# Patient Record
Sex: FEMALE | Race: WHITE
[De-identification: names, ages, dates, MRNs, and addresses within clinical notes are randomized per-mention and may not be internally consistent; named-entity substitution may affect disease eponyms.]

---

## 2017-10-25 ENCOUNTER — HOSPITAL ENCOUNTER (OUTPATIENT)
Dept: HOSPITAL 33 - SDC | Age: 67
Discharge: HOME | End: 2017-10-25
Attending: FAMILY MEDICINE
Payer: MEDICARE

## 2017-10-25 VITALS — SYSTOLIC BLOOD PRESSURE: 116 MMHG | OXYGEN SATURATION: 98 % | DIASTOLIC BLOOD PRESSURE: 72 MMHG | HEART RATE: 77 BPM

## 2017-10-25 DIAGNOSIS — K63.5: Primary | ICD-10-CM

## 2017-10-25 DIAGNOSIS — Z86.010: ICD-10-CM

## 2017-10-25 PROCEDURE — 36415 COLL VENOUS BLD VENIPUNCTURE: CPT

## 2017-10-25 PROCEDURE — 00810: CPT

## 2017-10-25 PROCEDURE — 0DBN8ZX EXCISION OF SIGMOID COLON, VIA NATURAL OR ARTIFICIAL OPENING ENDOSCOPIC, DIAGNOSTIC: ICD-10-PCS | Performed by: FAMILY MEDICINE

## 2017-10-25 PROCEDURE — 0DBK8ZX EXCISION OF ASCENDING COLON, VIA NATURAL OR ARTIFICIAL OPENING ENDOSCOPIC, DIAGNOSTIC: ICD-10-PCS | Performed by: FAMILY MEDICINE

## 2017-10-25 NOTE — OP
SURGERY DATE/TIME:  10/25/2017  0723    



PREOPERATIVE DIAGNOSIS:      History of colon polyps.



POSTOPERATIVE DIAGNOSIS:    Two colon polyps. 



PROCEDURE:    Colonoscopy. 



SURGEON: Linus Vences M.D.



ANESTHESIA:  MAC by Wyatt Goldstein CRNA.



ESTIMATED BLOOD LOSS:  Minimal. 



SPECIMENS:  Two hot forceps polypectomies. 



DESCRIPTION OF PROCEDURE: After informed written consent was obtained, the patient was 
taken to the endoscopy suite. She underwent monitored anesthesia and digital rectal exam 
showed normal sphincter tone and no internal lesions. The scope was inserted into the 
rectum and sequentially the entire colonic mucosa was traversed. The level of cecum was 
reached and verified with direct visualization of ileocecal valve.  There was a sessile 
polyp in the ascending colon which was grasped with forceps and cauterized with complete 
removal with good hemostasis. There were scattered diverticula throughout most of the 
length of the colon. There was another sessile polyp in the proximal sigmoid colon which 
was also removed with hot forceps in its entirety with good hemostasis and complete 
removal. The remainder of the exam was unremarkable. The scope was removed and the patient 
was transferred to the recovery room in excellent condition.

## 2018-02-27 ENCOUNTER — HOSPITAL ENCOUNTER (EMERGENCY)
Dept: HOSPITAL 33 - ED | Age: 68
Discharge: ANOTHER HOSPITAL | End: 2018-02-27
Payer: MEDICARE

## 2018-02-27 VITALS — SYSTOLIC BLOOD PRESSURE: 111 MMHG | HEART RATE: 112 BPM | DIASTOLIC BLOOD PRESSURE: 82 MMHG | OXYGEN SATURATION: 95 %

## 2018-02-27 DIAGNOSIS — R07.89: ICD-10-CM

## 2018-02-27 DIAGNOSIS — K21.9: ICD-10-CM

## 2018-02-27 DIAGNOSIS — M19.90: ICD-10-CM

## 2018-02-27 DIAGNOSIS — R06.03: Primary | ICD-10-CM

## 2018-02-27 DIAGNOSIS — E78.00: ICD-10-CM

## 2018-02-27 DIAGNOSIS — J44.9: ICD-10-CM

## 2018-02-27 DIAGNOSIS — Z79.899: ICD-10-CM

## 2018-02-27 DIAGNOSIS — Z85.118: ICD-10-CM

## 2018-02-27 DIAGNOSIS — I50.23: ICD-10-CM

## 2018-02-27 DIAGNOSIS — J45.909: ICD-10-CM

## 2018-02-27 DIAGNOSIS — Z87.891: ICD-10-CM

## 2018-02-27 DIAGNOSIS — I10: ICD-10-CM

## 2018-02-27 LAB
ALBUMIN SERPL-MCNC: 3.9 G/DL (ref 3.4–5)
ALP SERPL-CCNC: 85 U/L (ref 46–116)
ALT SERPL-CCNC: 26 U/L (ref 12–78)
ANION GAP SERPL CALC-SCNC: 15.3 MEQ/L (ref 5–15)
APTT PPP: 27.3 SECONDS (ref 25.3–37)
ARTERIAL PATENCY WRIST A: (no result)
AST SERPL QL: 22 U/L (ref 15–37)
BASE EXCESS BLDA CALC-SCNC: 1.6 MMOL/L (ref -2–2)
BILIRUB BLD-MCNC: 1.1 MG/DL (ref 0.2–1)
BNP SERPL-MCNC: 316 PG/ML (ref 0–125)
BUN SERPL-MCNC: 13 MG/DL (ref 9–20)
CALCIUM SPEC-MCNC: 9.1 MG/DL (ref 8.5–10.1)
CELLS COUNTED: 100
CHLORIDE SERPL-SCNC: 105 MEQ/L (ref 98–107)
CO2 SERPL-SCNC: 28.8 MEQ/L (ref 21–32)
COHGB BLD-MCNC: 1.9 % THGB (ref 0–6.9)
CREAT SERPL-MCNC: 0.64 MG/DL (ref 0.55–1.3)
FLUAV AG NPH QL IA: NEGATIVE
FLUBV AG NPH QL IA: NEGATIVE
GAS PNL BLDA: 12.5
GAS PNL BLDA: 37 C
GLUCOSE SERPL-MCNC: 149 MG/DL (ref 70–110)
GRANULOCYTES # BLD AUTO: 13.7 10*3/UL (ref 1.4–6.9)
HCO3 BLDA-SCNC: 28.5 MMOL/L (ref 22–28)
HCT VFR BLD AUTO: 38.5 % (ref 35–47)
HGB BLD-MCNC: 12.4 GM/DL (ref 12–16)
INHALED O2 CONCENTRATION: 100 %
INR PPP: 1.24 (ref 0.8–3)
MANUAL DIF COMMENT BLD-IMP: NORMAL
MCH RBC QN AUTO: 29.6 PG (ref 26–32)
MCHC RBC AUTO-ENTMCNC: 32.2 G/DL (ref 32–36)
METHGB MFR BLDA: 1.2 % (ref 1.4–1.5)
O2 A-A PPRESDIFF RESPIRATORY: 267 MM[HG]
PCO2 BLDA: 54 MMHG (ref 35–45)
PLATELET # BLD AUTO: 317 K/MM3 (ref 150–450)
PO2 BLDA: 379 MMHG (ref 75–100)
POTASSIUM BLDA-SCNC: 3.4 MMOL/L (ref 3.5–5.1)
POTASSIUM SERPLBLD-SCNC: 4.1 MEQ/L (ref 3.5–5.1)
PROT SERPL-MCNC: 8 GM/DL (ref 6.4–8.2)
RBC # BLD AUTO: 4.19 M/MM3 (ref 4.1–5.4)
RSV AG SPEC QL IA: POSITIVE
SAO2 % BLDA FROM PO2: 0.59 %
SAO2 % BLDA: 100.9 % (ref 95–100)
SAO2 % BLDA: 97.7 G/DF (ref 94–100)
SODIUM SERPL-SCNC: 145 MEQ/L (ref 136–145)
TOXIC GRANULES BLD QL SMEAR: (no result)
WBC # BLD AUTO: 21.4 K/MM3 (ref 4–10.5)

## 2018-02-27 PROCEDURE — 82803 BLOOD GASES ANY COMBINATION: CPT

## 2018-02-27 PROCEDURE — 71045 X-RAY EXAM CHEST 1 VIEW: CPT

## 2018-02-27 PROCEDURE — 84484 ASSAY OF TROPONIN QUANT: CPT

## 2018-02-27 PROCEDURE — 36600 WITHDRAWAL OF ARTERIAL BLOOD: CPT

## 2018-02-27 PROCEDURE — 83605 ASSAY OF LACTIC ACID: CPT

## 2018-02-27 PROCEDURE — 96365 THER/PROPH/DIAG IV INF INIT: CPT

## 2018-02-27 PROCEDURE — 85610 PROTHROMBIN TIME: CPT

## 2018-02-27 PROCEDURE — 99292 CRITICAL CARE ADDL 30 MIN: CPT

## 2018-02-27 PROCEDURE — 96360 HYDRATION IV INFUSION INIT: CPT

## 2018-02-27 PROCEDURE — 96361 HYDRATE IV INFUSION ADD-ON: CPT

## 2018-02-27 PROCEDURE — 85025 COMPLETE CBC W/AUTO DIFF WBC: CPT

## 2018-02-27 PROCEDURE — 36415 COLL VENOUS BLD VENIPUNCTURE: CPT

## 2018-02-27 PROCEDURE — 87631 RESP VIRUS 3-5 TARGETS: CPT

## 2018-02-27 PROCEDURE — 94002 VENT MGMT INPAT INIT DAY: CPT

## 2018-02-27 PROCEDURE — 93005 ELECTROCARDIOGRAM TRACING: CPT

## 2018-02-27 PROCEDURE — 87040 BLOOD CULTURE FOR BACTERIA: CPT

## 2018-02-27 PROCEDURE — 94640 AIRWAY INHALATION TREATMENT: CPT

## 2018-02-27 PROCEDURE — 80053 COMPREHEN METABOLIC PANEL: CPT

## 2018-02-27 PROCEDURE — 83880 ASSAY OF NATRIURETIC PEPTIDE: CPT

## 2018-02-27 PROCEDURE — 82375 ASSAY CARBOXYHB QUANT: CPT

## 2018-02-27 PROCEDURE — 83735 ASSAY OF MAGNESIUM: CPT

## 2018-02-27 PROCEDURE — 99291 CRITICAL CARE FIRST HOUR: CPT

## 2018-02-27 PROCEDURE — 85730 THROMBOPLASTIN TIME PARTIAL: CPT

## 2018-02-27 PROCEDURE — 93041 RHYTHM ECG TRACING: CPT

## 2018-02-27 PROCEDURE — 51702 INSERT TEMP BLADDER CATH: CPT

## 2018-02-27 NOTE — ERPHSYRPT
- History of Present Illness


Time Seen by Provider: 02/27/18 00:20


Source: patient, EMS


Exam Limitations: no limitations


Patient Subjective Stated Complaint: c/o SOA x 2 days


Triage Nursing Assessment: pt c/o SOA, wheezes throughout, anxious.


Physician History: 





66 y/o female with history of COPD and CHF brought in by ambulance for 

worsening shortness of breath for the past 2 days. Pt arrives to the ER in 

severe respiratory distress. Pt was immediately placed on bibap which has 

helped. Pt says that she does not want to be intubated. Pt states that she has 

not taken her medications over the past couple of days, including lasix. Pt 

also admits to having substernal chest pain that started this evening, 

tightness sensation, with radiation to back and lasting for 5 minutes.


Timing/Duration: day(s)


Activities at Onset: none


Severity of Dyspnea-Max: severe


Severity of Dyspnea-Current: severe


Possible Cause: frequent episodes


Modifying Factors: Improves With: nothing


Associated Symptoms: anxiety, chest pain/discomfort, productive cough


International travel in last 2 weeks: No


Allergies/Adverse Reactions: 








Tetanus Vaccines and Toxoid [Tetanus] Allergy (Severe, Verified 10/25/17 06:18)


 throat swelling





Home Medications: 








Albuterol Sulfate Mdi*** [Proair Hfa MDI***] 8.5 gm IH Q4HWA PRN 09/28/12 [

History]


Albuterol/Ipratropium 3ml Neb* [DUONEB 0.5-3 MG/3 ml Neb**] 3 ml IH Q4H 09/28/ 12 [History]


Atorvastatin Calcium [Lipitor] 40 mg PO HS 09/28/12 [History]


Calcium Citrate/Vitamin D3 [Citracal + D Caplet] 2 each PO BID 09/28/12 [History

]


Carvedilol 6.25 mg*** [Coreg 6.25 MG***] 12.5 mg PO BID 09/28/12 [History]


Furosemide 40 mg PO DAILY 09/29/14 [History]


Gabapentin 300 mg PO TID 09/29/14 [History]


Multivitamin [Multi-Vitamin Daily] 1 tab PO DAILY 09/29/14 [History]


Losartan Potassium 50 mg*** [Cozaar 50 MG***] 1 tab PO DAILY 06/26/16 [History]


Magnesium Oxide 400 mg*** [Mag-Ox 400***] 1 tab PO DAILY 06/26/16 [History]


Tiotropium Bromide [Spiriva] 18 mcg IH DAILY 06/27/16 [History]


Biotin 10,000 mcg PO DAILY 10/18/17 [History]


Cholecalciferol (Vitamin D3) [Vitamin D3] 5,000 unit PO DAILY 10/18/17 [History]


PANTOPRAZOLE 40 mg Tablet*** [Protonix 40MG Tablet***] 40 mg PO DAILY 10/18/17 [

History]


Potassium Chloride 10 Meq Tab* [Klor Con 10 MEQ***] 10 meq PO BID 10/18/17 [

History]


Hydrocodone Bit/Homatropine [Hydrocodone Compound Syrup] 480 ml PO Q12H PRN PRN 

10/25/17 [History]


Nitroglycerin 2.5 mg*** [Nitro-Bid 2.5 mg***] 2.5 mg PO DAILY 10/25/17 [History]


Zoledronic Acid/Mannitol/Wate* [Reclast 5 MG/100 Ml Solution***] 5 mg IV DAILY 

10/25/17 [History]


Cephalexin 500 mg PO TID 11/28/17 [History]





Hx Tetanus, Diphtheria Vaccination/Date Given: Yes


Hx Influenza Vaccination/Date Given: Yes (10/2014)


Hx Pneumococcal Vaccination/Date Given: Yes (2014)


Immunizations Up to Date: Yes





- Review of Systems


Constitutional: No Fever, No Chills


Eyes: No Symptoms


Ears, Nose, & Throat: No Symptoms


Respiratory: Cough, Dyspnea, Dyspnea on Exertion (BEDOYA), Wheezing


Cardiac: Chest Pain, Edema, No Syncope


Abdominal/Gastrointestinal: No Abdominal Pain, No Nausea, No Vomiting, No 

Diarrhea


Genitourinary Symptoms: No Dysuria


Musculoskeletal: No Back Pain, No Neck Pain


Skin: No Rash


Neurological: No Dizziness, No Focal Weakness, No Sensory Changes


Psychological: No Symptoms


Endocrine: No Symptoms


All Other Systems: Reviewed and Negative





- Past Medical History


Pertinent Past Medical History: Yes


Neurological History: No Pertinent History


ENT History: Cataracts


Cardiac History: Angina, High Cholesterol, Hypertension


Respiratory History: Asthma, COPD, Emphysema, Lung Cancer, Pneumonia


Endocrine Medical History: No Pertinent History


Musculoskeletal History: Arthritis, Fractures


GI Medical History: GERD, Hemorrhoids, Polyps


 History: No Pertinent History


Psycho-Social History: Anxiety


Female Reproductive Disorders: No Pertinent History


Other Medical History: frequent urination, bladder tie up x2





- Past Surgical History


Past Surgical History: Yes


Neuro Surgical History: No Pertinent History


Cardiac: Cardiac Catheterization


Respiratory: Lobectomy


Gastrointestinal: Appendectomy


Genitourinary: No Pertinent History


Musculoskeletal: No Pertinent History


Female Surgical History: Hysterectomy, Tubal Ligation


Other Surgical History: states right upper lobe lung removed., right total knee 

replacement





- Social History


Smoking Status: Former smoker


How long have you smoked: 40 years


Exposure to second hand smoke: Yes


Drug Use: none





- Female History


Hx Pregnant Now: No





- Nursing Vital Signs


Nursing Vital Signs: 


 Initial Vital Signs











Temperature  97.5 F   02/27/18 00:33


 


Pulse Rate  120 H  02/27/18 00:33


 


Respiratory Rate  34 H  02/27/18 00:33


 


O2 Sat by Pulse Oximetry  9 L  02/27/18 00:33








 Pain Scale











Pain Intensity                 4

















- Physical Exam


General Appearance: no apparent distress, alert


Eye Exam: PERRL/EOMI


Ears, Nose, Throat Exam: hearing grossly normal, normal ENT inspection


Neck Exam: normal inspection, supple


Respiratory Exam: respiratory distress, accessory muscle use, crackles/rales, 

wheezing


Cardiovascular/Chest Exam: normal heart sounds, regular rate/rhythm


Abdominal/Gastrointestinal Exam: soft, No tenderness, No distention, No mass


Extremity Exam: non-tender, normal range of motion, normal inspection, no calf 

tenderness, no pedal edema


Neurologic Exam: alert, oriented x 3, cooperative, CNs II-XII nml as tested, 

sensation nml, No motor deficits


Skin Exam: normal color, warm, No dry


**SpO2 Interpretation**: normal


SpO2: 99





- Course


Nursing assessment & vital signs reviewed: Yes


EKG Interpreted by Me: RATE (hr 105), Ischemic ST-T changes (ST depressions in 

leads V2, V3, V4)


Ordered Tests: 


 Active Orders 24 hr











 Category Date Time Status


 


 Cardiac Monitor STAT Care  02/27/18 00:19 Active


 


 EKG-ER Only STAT Care  02/27/18 00:18 Active


 


 Baker [Catheter-[place] Baker] STAT Care  02/27/18 00:24 Active


 


 IV Insertion STAT Care  02/27/18 00:18 Active


 


 NPO (ED) STAT Care  02/27/18 00:18 Active


 


 CHEST 1 VIEW (PORTABLE) Stat Exams  02/27/18 00:16 Taken


 


 ARTERIAL BLOOD GASES Stat Lab  02/27/18 00:30 Completed


 


 BLOOD CULTURE Stat Lab  02/27/18 00:53 Received


 


 CBC W DIFF Stat Lab  02/27/18 00:25 Completed


 


 CMP Stat Lab  02/27/18 00:25 Completed


 


 Lactic Acid Stat Lab  02/27/18 00:30 Completed


 


 MAGNESIUM Stat Lab  02/27/18 00:25 Completed


 


 Manual Differential NC Stat Lab  02/27/18 00:25 Completed


 


 NT PRO BNP Stat Lab  02/27/18 00:25 Completed


 


 PROTIME WITH INR Stat Lab  02/27/18 00:25 Completed


 


 PTT Stat Lab  02/27/18 00:25 Completed


 


 TROPONIN Q3H Lab  02/27/18 00:25 Completed


 


 TROPONIN Q3H Lab  02/27/18 03:30 Ordered


 


 TROPONIN Q3H Lab  02/27/18 06:30 Ordered


 


 TROPONIN Q3H Lab  02/27/18 09:30 Ordered


 


 TROPONIN Q3H Lab  02/27/18 12:30 Ordered


 


 BiPap/CPAP Assessment STAT RT  02/27/18 00:18 Active


 


 Respiratory Nebulizer STAT RT  02/27/18 00:19 Completed








Medication Summary














Discontinued Medications














Generic Name Dose Route Start Last Admin





  Trade Name Freq  PRN Reason Stop Dose Admin


 


Albuterol Sulfate  2.5 mg  02/27/18 00:18  02/27/18 00:45





  Proventil 2.5 Mg/3 Ml Neb***  IH  02/27/18 00:19  2.5 mg





  STAT ONE   Administration


 


Albuterol Sulfate  Confirm  02/27/18 00:43  





  Proventil 2.5 Mg/3 Ml Neb***  Administered  02/27/18 00:44  





  Dose   





  2.5 mg   





  IH   





  .STK-MED ONE   


 


Aspirin  324 mg  02/27/18 00:42  02/27/18 00:50





  Baby Aspirin 81 Mg Chew***  PO  02/27/18 00:43  324 mg





  STAT ONE   Administration


 


Aspirin  Confirm  02/27/18 00:52  





  Baby Aspirin 81 Mg Chew***  Administered  02/27/18 00:53  





  Dose   





  324 mg   





  .ROUTE   





  .STK-MED ONE   


 


Furosemide  60 mg  02/27/18 00:24  02/27/18 00:32





  Lasix 40 Mg/4 Ml***  IV  02/27/18 00:25  60 mg





  STAT ONE   Administration


 


Furosemide  Confirm  02/27/18 00:25  





  Furosemide 100mg/10 Ml Vial***  Administered  02/27/18 00:26  





  Dose   





  100 mg   





  .ROUTE   





  .STK-MED ONE   


 


Azithromycin  500 mg in 250 mls @ 250 mls/hr  02/27/18 01:28  02/27/18 01:51





  Zithromax 500 Mg/ 250 Ml Nacl Premix  IV  02/27/18 02:27  250 mls/hr





  STAT ONE   Administration


 


Azithromycin  Confirm  02/27/18 01:35  





  Zithromax 500 Mg/ 250 Ml Nacl Premix  Administered  02/27/18 01:36  





  Dose   





  500 mg in 250 mls @ ud   





  IV   





  .STK-MED ONE   


 


Lorazepam  0.5 mg  02/27/18 00:20  02/27/18 00:21





  Ativan 2 Mg/1 Ml Vial***  IV  02/27/18 00:21  0.5 mg





  STAT ONE   Administration


 


Lorazepam  Confirm  02/27/18 00:19  





  Ativan 2 Mg/1 Ml Vial***  Administered  02/27/18 00:20  





  Dose   





  2 mg   





  .ROUTE   





  .STK-MED ONE   


 


Lorazepam  0.5 mg  02/27/18 01:48  02/27/18 01:51





  Ativan 2 Mg/1 Ml Vial***  IV  02/27/18 01:49  0.5 mg





  ONCE ONE   Administration


 


Lorazepam  Confirm  02/27/18 01:48  





  Ativan 2 Mg/1 Ml Vial***  Administered  02/27/18 01:49  





  Dose   





  2 mg   





  .ROUTE   





  .STK-MED ONE   


 


Lorazepam  0.5 mg  02/27/18 02:52  02/27/18 02:56





  Ativan 2 Mg/1 Ml Vial***  IV  02/27/18 02:53  0.5 mg





  STAT ONE   Administration











Lab/Rad Data: 


 Laboratory Result Diagrams





 02/27/18 00:25 





 02/27/18 00:25 





 Laboratory Results











  02/27/18 02/27/18 02/27/18 Range/Units





  00:58 00:30 00:25 


 


WBC     (4.0-10.5)  K/mm3


 


RBC     (4.1-5.4)  M/mm3


 


Hgb     (12.0-16.0)  gm/dl


 


Hct     (35-47)  %


 


MCV     ()  fl


 


MCH     (26-32)  pg


 


MCHC     (32-36)  g/dl


 


RDW     (11.5-14.0)  %


 


Plt Count     (150-450)  K/mm3


 


MPV     (6-9.5)  fl


 


INR     (0.8-3.0)  


 


APTT     (25.3-37.0)  SECONDS


 


Puncture Site   rr   


 


pCO2   54 H   (35-45)  mmHg


 


pO2   379 H*   ()  mmHg


 


Base Excess   1.6   (-2.0-2.0)  


 


O2 Saturation   97.7   ()  g/dF


 


ABG pH   7.33 L   (7.35-7.45)  


 


ABG HCO3   28.5 H   (22-28)  


 


ABG O2 Sat (Measured)   100.9 H   ()  %


 


Mal Test   y   


 


A-a Gradient   267   


 


a/A Ratio   0.59   


 


Hemoglobin   12.5   


 


Carboxyhemoglobin   1.9   (0.0-6.9)  % THgb


 


Methemoglobin   1.2 L   (1.4-1.5)  %


 


Temperature   37.0   C


 


POC O2 Flow Rate   100   %


 


Vent Mode   BiPAP   


 


Inspiratory BiPAP   11   


 


Expiratory BiPAP   6   


 


Sodium     (136-145)  mEq/L


 


Potassium   3.4 L   (3.5-5.1)  mEq/L


 


Chloride     ()  mEq/L


 


Carbon Dioxide     (21-32)  mEq/L


 


Anion Gap     (5-15)  MEQ/L


 


BUN     (9-20)  mg/dL


 


Creatinine     (0.55-1.30)  mg/dl


 


Estimated GFR     ML/MIN


 


Glucose     ()  MG/DL


 


Lactic Acid   0.7   (0.4-2.0)  


 


Calcium     (8.5-10.1)  mg/dL


 


Magnesium     (1.8-2.4)  mg/dL


 


Total Bilirubin     (0.2-1.0)  mg/dL


 


AST     (15-37)  U/L


 


ALT     (12-78)  U/L


 


Alkaline Phosphatase     ()  U/L


 


Troponin I    < 0.017  (0.000-0.056)  ng/ml


 


NT-Pro-B Natriuret Pep     (0-125)  pg/ml


 


Serum Total Protein     (6.4-8.2)  gm/dL


 


Albumin     (3.4-5.0)  g/dL


 


Influenza Type A Ag  NEGATIVE    (NEGATIVE)  


 


Influenza Type B Ag  NEGATIVE    (NEGATIVE)  


 


RSV (PCR)  POSITIVE    (Negative)  














  02/27/18 02/27/18 02/27/18 Range/Units





  00:25 00:25 00:25 


 


WBC    21.4 H  (4.0-10.5)  K/mm3


 


RBC    4.19  (4.1-5.4)  M/mm3


 


Hgb    12.4  (12.0-16.0)  gm/dl


 


Hct    38.5  (35-47)  %


 


MCV    91.9  ()  fl


 


MCH    29.6  (26-32)  pg


 


MCHC    32.2  (32-36)  g/dl


 


RDW    13.6  (11.5-14.0)  %


 


Plt Count    317  (150-450)  K/mm3


 


MPV    11.3 H  (6-9.5)  fl


 


INR  1.24    (0.8-3.0)  


 


APTT  27.3    (25.3-37.0)  SECONDS


 


Puncture Site     


 


pCO2     (35-45)  mmHg


 


pO2     ()  mmHg


 


Base Excess     (-2.0-2.0)  


 


O2 Saturation     ()  g/dF


 


ABG pH     (7.35-7.45)  


 


ABG HCO3     (22-28)  


 


ABG O2 Sat (Measured)     ()  %


 


Mal Test     


 


A-a Gradient     


 


a/A Ratio     


 


Hemoglobin     


 


Carboxyhemoglobin     (0.0-6.9)  % THgb


 


Methemoglobin     (1.4-1.5)  %


 


Temperature     C


 


POC O2 Flow Rate     %


 


Vent Mode     


 


Inspiratory BiPAP     


 


Expiratory BiPAP     


 


Sodium   145   (136-145)  mEq/L


 


Potassium   4.1   (3.5-5.1)  mEq/L


 


Chloride   105   ()  mEq/L


 


Carbon Dioxide   28.8   (21-32)  mEq/L


 


Anion Gap   15.3 H   (5-15)  MEQ/L


 


BUN   13   (9-20)  mg/dL


 


Creatinine   0.64   (0.55-1.30)  mg/dl


 


Estimated GFR   > 60   ML/MIN


 


Glucose   149 H   ()  MG/DL


 


Lactic Acid     (0.4-2.0)  


 


Calcium   9.1   (8.5-10.1)  mg/dL


 


Magnesium   1.8   (1.8-2.4)  mg/dL


 


Total Bilirubin   1.10 H   (0.2-1.0)  mg/dL


 


AST   22   (15-37)  U/L


 


ALT   26   (12-78)  U/L


 


Alkaline Phosphatase   85   ()  U/L


 


Troponin I     (0.000-0.056)  ng/ml


 


NT-Pro-B Natriuret Pep   316 H   (0-125)  pg/ml


 


Serum Total Protein   8.0   (6.4-8.2)  gm/dL


 


Albumin   3.9   (3.4-5.0)  g/dL


 


Influenza Type A Ag     (NEGATIVE)  


 


Influenza Type B Ag     (NEGATIVE)  


 


RSV (PCR)     (Negative)  














- Progress


Progress: improved


Air Movement: fair


Progress Note: 





02/27/18 02:43


The CXR shows increase vascular congestion. The patient has shown significant 

improvement since arriving to the ER after being placed on bipap. Pt was 

already given a dose of solumedrol 125mg in route and has received 3 doses of 

duonebs as well as lasix 60mg with baker catheter insertion. Pt is RSV 

positive. Influenza is negative. Pt was started on azithromycin. Each time the 

mask is taken off, pt starts to decompensate. Pt also has ST depressions in 

leads V2-V4 but the first troponin was negative. Pt was given a dose of ASA. We 

don't have ICU staffing and the patient will require a higher level of care. Pt 

[facility] and will be transferred under the care 

of Dr Wills.


02/27/18 02:53








- Departure


Time of Disposition: 02:52


Departure Disposition: Transfer


Clinical Impression: 


 Respiratory distress





COPD (chronic obstructive pulmonary disease)


Qualifiers:


 COPD type: unspecified COPD Qualified Code(s): J44.9 - Chronic obstructive 

pulmonary disease, unspecified





CHF (congestive heart failure)


Qualifiers:


 Heart failure type: systolic Heart failure chronicity: acute on chronic 

Qualified Code(s): I50.23 - Acute on chronic systolic (congestive) heart failure





Condition: Critical


Critical Care Time: Yes


Critical Care Time(excluding separately billable procedures):  minutes


Referrals: 


OMEGA RODGERS [Primary Care Provider] - 


Instructions:  Heart Failure, Chronic Obstructive Pulmonary Disease

## 2018-02-27 NOTE — XRAY
Indication: Short of breath.  History right lung cancer.



Comparison: June 26, 2016.



Portable chest unchanged with stable right hemithorax postsurgical changes and

right hilar fullness.  Left lung remains clear.  Heart is not enlarged.  Bony

thorax intact again with mild osteopenia and degenerative changes.



Impression: Stable nonacute chest with chronic features.

## 2023-08-19 ENCOUNTER — HOSPITAL ENCOUNTER (INPATIENT)
Dept: HOSPITAL 33 - ED | Age: 73
LOS: 8 days | Discharge: NURSING, REHAB OR LONG-TERM CARE | DRG: 190 | End: 2023-08-27
Attending: INTERNAL MEDICINE | Admitting: INTERNAL MEDICINE
Payer: MEDICARE

## 2023-08-19 DIAGNOSIS — Z79.899: ICD-10-CM

## 2023-08-19 DIAGNOSIS — Z20.828: ICD-10-CM

## 2023-08-19 DIAGNOSIS — M19.90: ICD-10-CM

## 2023-08-19 DIAGNOSIS — Z85.118: ICD-10-CM

## 2023-08-19 DIAGNOSIS — M54.9: ICD-10-CM

## 2023-08-19 DIAGNOSIS — H57.11: ICD-10-CM

## 2023-08-19 DIAGNOSIS — I50.9: ICD-10-CM

## 2023-08-19 DIAGNOSIS — J44.1: Primary | ICD-10-CM

## 2023-08-19 DIAGNOSIS — Z87.891: ICD-10-CM

## 2023-08-19 DIAGNOSIS — J18.9: ICD-10-CM

## 2023-08-19 DIAGNOSIS — Z99.81: ICD-10-CM

## 2023-08-19 DIAGNOSIS — E78.5: ICD-10-CM

## 2023-08-19 DIAGNOSIS — N39.0: ICD-10-CM

## 2023-08-19 DIAGNOSIS — I11.0: ICD-10-CM

## 2023-08-19 LAB
ALBUMIN SERPL-MCNC: 4.2 G/DL (ref 3.5–5)
ALP SERPL-CCNC: 96 U/L (ref 38–126)
ALT SERPL-CCNC: 28 U/L (ref 0–35)
ANION GAP SERPL CALC-SCNC: 10.6 MEQ/L (ref 5–15)
APTT PPP: 28.2 SECONDS (ref 25.1–36.5)
AST SERPL QL: 45 U/L (ref 14–36)
BACTERIA UR CULT: (no result)
BASOPHILS # BLD AUTO: 0.04 X10^3/UL (ref 0–0.4)
BASOPHILS NFR BLD AUTO: 0.4 % (ref 0–0.4)
BILIRUB BLD-MCNC: 1.7 MG/DL (ref 0.2–1.3)
BUN SERPL-MCNC: 22 MG/DL (ref 7–17)
CALCIUM SPEC-MCNC: 9.3 MG/DL (ref 8.4–10.2)
CHLORIDE SERPL-SCNC: 89 MMOL/L (ref 98–107)
CO2 SERPL-SCNC: 38 MMOL/L (ref 22–30)
CREAT SERPL-MCNC: 0.73 MG/DL (ref 0.52–1.04)
EOSINOPHIL # BLD AUTO: 0.01 X10^3/UL (ref 0–0.5)
FLUAV AG NPH QL IA: NEGATIVE
FLUBV AG NPH QL IA: NEGATIVE
GFR SERPLBLD BASED ON 1.73 SQ M-ARVRAT: > 60 ML/MIN
GLUCOSE SERPL-MCNC: 144 MG/DL (ref 74–106)
HCT VFR BLD AUTO: 37 % (ref 35–47)
HGB BLD-MCNC: 11.8 G/DL (ref 12–16)
IMM GRANULOCYTES # BLD: 0.09 X10^3U/L (ref 0–0.03)
IMM GRANULOCYTES NFR BLD: 0.9 % (ref 0–0.4)
INR PPP: 1.05 (ref 0.8–3)
LYMPHOCYTES # SPEC AUTO: 0.66 X10^3/UL (ref 1–4.6)
MCH RBC QN AUTO: 29 PG (ref 26–32)
MCHC RBC AUTO-ENTMCNC: 31.9 G/DL (ref 32–36)
MONOCYTES # BLD AUTO: 0.33 X10^3/UL (ref 0–1.3)
NRBC # BLD AUTO: 0 X10^3U/L (ref 0–0.01)
NRBC BLD AUTO-RTO: 0 % (ref 0–0.1)
NT-PROBNP SERPL-MCNC: 1080 PG/ML (ref ?–300)
PLATELET # BLD AUTO: 279 X10^3/UL (ref 150–450)
POTASSIUM SERPLBLD-SCNC: 4.2 MMOL/L (ref 3.5–5.1)
PROT SERPL-MCNC: 7 G/DL (ref 6.3–8.2)
PROTHROMBIN TIME: 11.4 SECONDS (ref 9.4–12.5)
RBC # BLD AUTO: 4.07 X10^6/UL (ref 4.1–5.4)
RBC # URNS HPF: (no result) /HPF (ref 0–5)
RSV AG SPEC QL IA: NEGATIVE
SARS-COV-2 AG RESP QL IA.RAPID: NEGATIVE
SODIUM SERPL-SCNC: 133 MMOL/L (ref 137–145)
WBC # BLD AUTO: 10.2 X10^3/UL (ref 4–10.5)
WBC URNS QL MICRO: (no result) /HPF (ref 0–5)

## 2023-08-19 PROCEDURE — 94640 AIRWAY INHALATION TREATMENT: CPT

## 2023-08-19 PROCEDURE — 99100 ANES PT EXTEME AGE<1 YR&>70: CPT

## 2023-08-19 PROCEDURE — 85027 COMPLETE CBC AUTOMATED: CPT

## 2023-08-19 PROCEDURE — 93041 RHYTHM ECG TRACING: CPT

## 2023-08-19 PROCEDURE — 94003 VENT MGMT INPAT SUBQ DAY: CPT

## 2023-08-19 PROCEDURE — 96374 THER/PROPH/DIAG INJ IV PUSH: CPT

## 2023-08-19 PROCEDURE — 94002 VENT MGMT INPAT INIT DAY: CPT

## 2023-08-19 PROCEDURE — 94668 MNPJ CHEST WALL SBSQ: CPT

## 2023-08-19 PROCEDURE — 0241U: CPT

## 2023-08-19 PROCEDURE — 85730 THROMBOPLASTIN TIME PARTIAL: CPT

## 2023-08-19 PROCEDURE — 36410 VNPNXR 3YR/> PHY/QHP DX/THER: CPT

## 2023-08-19 PROCEDURE — 83605 ASSAY OF LACTIC ACID: CPT

## 2023-08-19 PROCEDURE — 36415 COLL VENOUS BLD VENIPUNCTURE: CPT

## 2023-08-19 PROCEDURE — 80198 ASSAY OF THEOPHYLLINE: CPT

## 2023-08-19 PROCEDURE — 93005 ELECTROCARDIOGRAM TRACING: CPT

## 2023-08-19 PROCEDURE — 82803 BLOOD GASES ANY COMBINATION: CPT

## 2023-08-19 PROCEDURE — 96365 THER/PROPH/DIAG IV INF INIT: CPT

## 2023-08-19 PROCEDURE — 99285 EMERGENCY DEPT VISIT HI MDM: CPT

## 2023-08-19 PROCEDURE — 87040 BLOOD CULTURE FOR BACTERIA: CPT

## 2023-08-19 PROCEDURE — 93268 ECG RECORD/REVIEW: CPT

## 2023-08-19 PROCEDURE — G0378 HOSPITAL OBSERVATION PER HR: HCPCS

## 2023-08-19 PROCEDURE — 94760 N-INVAS EAR/PLS OXIMETRY 1: CPT

## 2023-08-19 PROCEDURE — 82375 ASSAY CARBOXYHB QUANT: CPT

## 2023-08-19 PROCEDURE — 71250 CT THORAX DX C-: CPT

## 2023-08-19 PROCEDURE — 83880 ASSAY OF NATRIURETIC PEPTIDE: CPT

## 2023-08-19 PROCEDURE — 71045 X-RAY EXAM CHEST 1 VIEW: CPT

## 2023-08-19 PROCEDURE — 96375 TX/PRO/DX INJ NEW DRUG ADDON: CPT

## 2023-08-19 PROCEDURE — 85025 COMPLETE CBC W/AUTO DIFF WBC: CPT

## 2023-08-19 PROCEDURE — 71046 X-RAY EXAM CHEST 2 VIEWS: CPT

## 2023-08-19 PROCEDURE — 80053 COMPREHEN METABOLIC PANEL: CPT

## 2023-08-19 PROCEDURE — 36600 WITHDRAWAL OF ARTERIAL BLOOD: CPT

## 2023-08-19 PROCEDURE — 87086 URINE CULTURE/COLONY COUNT: CPT

## 2023-08-19 PROCEDURE — 51702 INSERT TEMP BLADDER CATH: CPT

## 2023-08-19 PROCEDURE — 81001 URINALYSIS AUTO W/SCOPE: CPT

## 2023-08-19 PROCEDURE — 94667 MNPJ CHEST WALL 1ST: CPT

## 2023-08-19 PROCEDURE — 84484 ASSAY OF TROPONIN QUANT: CPT

## 2023-08-19 PROCEDURE — 36000 PLACE NEEDLE IN VEIN: CPT

## 2023-08-19 PROCEDURE — 85610 PROTHROMBIN TIME: CPT

## 2023-08-19 PROCEDURE — 99291 CRITICAL CARE FIRST HOUR: CPT

## 2023-08-19 RX ADMIN — POTASSIUM CHLORIDE SCH MEQ: 10 TABLET, EXTENDED RELEASE ORAL at 23:21

## 2023-08-19 RX ADMIN — SIMVASTATIN SCH MG: 20 TABLET, FILM COATED ORAL at 23:20

## 2023-08-19 RX ADMIN — ALPRAZOLAM PRN MG: 1 TABLET ORAL at 23:34

## 2023-08-19 RX ADMIN — IPRATROPIUM BROMIDE AND ALBUTEROL SULFATE SCH ML: .5; 3 SOLUTION RESPIRATORY (INHALATION) at 23:43

## 2023-08-19 RX ADMIN — CARVEDILOL SCH MG: 12.5 TABLET, FILM COATED ORAL at 23:42

## 2023-08-19 NOTE — ERPHSYRPT
- History of Present Illness


Source: patient, EMS


Exam Limitations: no limitations


Patient Subjective Stated Complaint: pt reports shortness of breath beginning 

last evening, reports using her home nebulizer and inhalers without any relief


Triage Nursing Assessment: pt is aox3, short of breath upon arrival, pt with 

audible wheezes and wet cough appreciated, afebrile, radial pulses strong and 

equal, cap refill < 3 seconds, pt skin pink warm dry. edema noted to the BLE, 

skin is red, shiny and tight. skin is intact at this time.


Physician History: 





72 yo WF who is 4L O2 dependent presents w dyspnea since last PM which occurs at

rest and exertion. Pt had some sub-sternal chest pain last night which was 

relieved by 2 SL NTG. She denies current chest 

pain/cough/coryza/fever/nausea/vomiting/diarrhea/melena/hematochezia. Pt has 2+ 

pre-tibial edema w mild erythema which pt states is chronic. 


Timing/Duration: other (Last night)


Activities at Onset: rest


Severity of Dyspnea-Max: moderate


Severity of Dyspnea-Current: moderate


Possible Cause: frequent episodes


Modifying Factors: Improves With: activity, exertion


Associated Symptoms: denies symptoms, edema


Allergies/Adverse Reactions: 








Tetanus Vaccines and Toxoid [Tetanus] Allergy (Severe, Verified 08/19/23 16:08)


   throat swelling





Home Medications: 








Albuterol Sulfate Mdi*** [ALBUTEROL/Proair Hfa MDI***] 8.5 gm IH Q4HWA PRN 

09/28/12 [History]


Albuterol/Ipratropium 3ml Neb* [DUONEB 0.5-3 MG/3 ml Neb**] 3 ml IH Q4H 09/28/12

[History]


Atorvastatin Calcium [Lipitor] 40 mg PO HS 09/28/12 [History]


Calcium Citrate/Vitamin D3 [Citracal + D Caplet] 2 each PO BID 09/28/12 

[History]


Multivitamin [Multi-Vitamin Daily] 1 tab PO DAILY 09/29/14 [History]


Tiotropium Bromide [Spiriva] 18 mcg IH DAILY 06/27/16 [History]


Cholecalciferol (Vitamin D3) [Vitamin D3] 50 mg PO DAILY 10/18/17 [History]


Potassium Chloride Tab* [Klor Con] 10 meq PO BID 10/18/17 [History]


Nitroglycerin Sr *** [Nitro-Bid ***] 2.5 mg PO DAILY 10/25/17 [History]


Aspirin [Aspirin EC] 81 mg PO DAILY 11/30/18 [History]


Zoledronic Acid/Mannitol&Water [Reclast 5 mg/100 ml Solution] 5 mg IV UD 01/ 25/21 [History]


Ascorbic Acid [Vitamin C] 1,000 mg PO DAILY 03/14/22 [History]


Biotin 10,000 mcg PO DAILY 03/14/22 [History]


Furosemide 80 mg PO BID 03/14/22 [History]


Magnesium Oxide [Magnesium] 250 mg PO DAILY 03/14/22 [History]


Prednisone 10 mg*** [Deltasone 10 mg***] 10 mg PO DAILY 03/14/22 [History]





Hx Tetanus, Diphtheria Vaccination/Date Given: Yes


Hx Influenza Vaccination/Date Given: Yes (10/2014)


Hx Pneumococcal Vaccination/Date Given: Yes (2014)


Immunizations Up to Date: Yes





Travel Risk





- International Travel


Have you traveled outside of the country in past 3 weeks: No





- Coronavirus Screening


Are you exhibiting any of the following symptoms?: No


Close contact with a COVID-19 positive Pt in past 14-21 Days: No





- Vaccine Status


Have you recieved a Covid-19 vaccination: Yes


: Unknown





- Vaccination Dates


Dates if Unknown: UNK





- Review of Systems


Constitutional: No Symptoms, Malaise


Eyes: No Symptoms


Ears, Nose, & Throat: No Symptoms


Respiratory: No Symptoms, Dyspnea, Dyspnea on Exertion (BEDOYA), Wheezing


Cardiac: No Symptoms, Chest Pain


Abdominal/Gastrointestinal: No Symptoms


Genitourinary Symptoms: No Symptoms


Musculoskeletal: No Symptoms


Skin: No Symptoms


Neurological: No Symptoms


Psychological: No Symptoms


Endocrine: No Symptoms


Hematologic/Lymphatic: No Symptoms


Immunological/Allergic: No Symptoms





- Past Medical History


Pertinent Past Medical History: Yes


Neurological History: No Pertinent History


ENT History: Cataracts


Cardiac History: Angina, High Cholesterol, Hypertension


Respiratory History: COPD, Lung Cancer


Endocrine Medical History: No Pertinent History


Musculoskeletal History: Degenerative Disk Disease, Osteoporosis


GI Medical History: GERD


 History: No Pertinent History


Psycho-Social History: Anxiety


Female Reproductive Disorders: No Pertinent History


Other Medical History: Pt denies heart disease but she does have episodes of 

angina and follows Dr. irving every 3 to 6 months. Pt reports negative stress 

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





- Past Surgical History


Past Surgical History: Yes


Neuro Surgical History: No Pertinent History


Cardiac: No Pertinent History


Respiratory: Lobectomy


Gastrointestinal: Appendectomy


Genitourinary: No Pertinent History


Musculoskeletal: Orthopedic Surgery


Female Surgical History: Hysterectomy, Tubal Ligation


Other Surgical History: Right upper lobectomy. Right thumb reconstructive 

surgery. right hand surgery. Right knee replacement.  left elbow bursa surgery, 

back surgery





- Social History


Smoking Status: Former smoker


How long have you smoked: 40 years


Exposure to second hand smoke: No


Drug Use: none


Patient Lives Alone: No


Significant Family History: no pertinent family hx





- Nursing Vital Signs


Nursing Vital Signs: 


                               Initial Vital Signs











Temperature  98.0 F   08/19/23 15:57


 


Pulse Rate  98 H  08/19/23 15:57


 


Respiratory Rate  26 H  08/19/23 15:57


 


Blood Pressure  146/99   08/19/23 15:57


 


O2 Sat by Pulse Oximetry  96   08/19/23 15:57








                                   Pain Scale











Pain Intensity                 5











Hypertensive/Tachypneic





- Physical Exam


General Appearance: mild distress, anxiety


Eye Exam: PERRL/EOMI, eyes nml inspection


Ears, Nose, Throat Exam: hearing grossly normal, normal ENT inspection, normal 

pharynx


Neck Exam: normal inspection, non-tender, supple, full range of motion, No 

Brudzinski, No Kernig's, No meningismus


Respiratory Exam: prolonged expirations, wheezing (Wheezing in all lung fields)


Cardiovascular/Chest Exam: regular rate/rhythm, normal peripheral pulses, No 

murmur


Abdominal/Gastrointestinal Exam: soft, normal bowel sounds


Extremity Exam: swelling (2+ B LE edema w mild B pre-tibial erythema)


Peripheral Pulses Exam: carotid (R): 2+, carotid (L): 2+


Neurologic Exam: alert, oriented x 3, cooperative, CNs II-XII nml as tested, 

normal mood/affect, sensation nml


Skin Exam: normal color, warm, dry


Lymphatic Exam: No adenopathy


**SpO2 Interpretation**: normal


SpO2: 96


O2 Delivery: Nasal Cannula (4L O2 NC)





- Course


Nursing assessment & vital signs reviewed: Yes


EKG Interpreted by Me: RATE (NSR/Rate 92/Prolonged QTc/RBBB/Artifact/Nonspecific

ST segment changes)





- Radiology Exams


  ** Chest


X-ray Interpretation: Interpreted by me (No significant change)





- CT Exams


  ** Chest


CT Interpretation: Tele-radiologist Report (COPD/LLL infiltrate)


Ordered Tests: 


                               Active Orders 24 hr











 Category Date Time Status


 


 Bedrest ROUTINE Activity  08/19/23 19:19 Active


 


 Call Admit Doctor for Orders ON ADMISSION Care  08/19/23 19:16 Active


 


 Cardiac Monitor STAT Care  08/19/23 16:13 Active


 


 Code Status Order ROUTINE Care  08/19/23 19:16 Active


 


 EKG-ER Only STAT Care  08/19/23 16:04 Active


 


 IV Insertion STAT Care  08/19/23 16:13 Active


 


 Oxygen-ED Only Nasal Cannula 4 lpm Care  08/19/23 16:13 Active


 


 Place in Observation ROUTINE Care  08/19/23 19:16 Active


 


 Telemetry q6h Care  08/19/23 19:16 Active


 


 Heart-Healthy  Diet Diet  08/20/23 Breakfast Active


 


 CHEST 1 VIEW (PORTABLE) Stat Exams  08/19/23 16:05 Taken


 


 CHEST WITHOUT CONTRAST [CT] Stat Exams  08/19/23 17:30 Completed


 


 BLOOD CULTURE Stat Lab  08/19/23 16:20 Received


 


 CBC W DIFF Stat Lab  08/19/23 16:10 Completed


 


 CMP Stat Lab  08/19/23 16:10 Completed


 


 Lactic Acid Stat Lab  08/19/23 16:35 Completed


 


 NT PRO BNPII Stat Lab  08/19/23 16:10 Completed


 


 PROTIME WITH INR Stat Lab  08/19/23 16:10 Completed


 


 PTT Stat Lab  08/19/23 16:10 Completed


 


 TROPONIN Q4H Lab  08/19/23 16:10 Completed


 


 TROPONIN Q4H Lab  08/19/23 20:15 Ordered


 


 TROPONIN Q4H Lab  08/20/23 00:15 Ordered


 


 Oxygen Nasal Cannula 4 lpm RT  08/19/23 19:16 Active


 


 Respiratory Therapy Assessment DAILY RT  08/19/23 16:38 Active


 


 Respiratory Therapy Consult ONCE RT  08/19/23 19:16 Active


 


 Transfer Order Routine Transfer  08/19/23 Ordered








Medication Summary














Discontinued Medications














Generic Name Dose Route Start Last Admin





  Trade Name Freq  PRN Reason Stop Dose Admin


 


Albuterol Sulfate  5 mg  08/19/23 19:24 





  Albuterol Sulfate 2.5 Mg/3 Ml Neb  IH  08/19/23 19:25 





  STAT ONE  


 


Albuterol Sulfate  Confirm  08/19/23 19:42 





  Albuterol Sulfate 2.5 Mg/3 Ml Neb  Administered  08/19/23 19:43 





  Dose  





  5 mg  





  IH  





  .STK-MED ONE  


 


Albuterol/Ipratropium  3 ml  08/19/23 16:11  08/19/23 16:21





  Ipratropium/Albuterol Sulfate 3 Ml Ampul.Neb  IH  08/19/23 16:12  3 ml





  STAT ONE   Administration


 


Albuterol/Ipratropium  Confirm  08/19/23 16:16 





  Ipratropium/Albuterol Sulfate 3 Ml Ampul.Neb  Administered  08/19/23 16:17 





  Dose  





  3 ml  





  IH  





  .STK-MED ONE  


 


Methylprednisolone Sodium  0 mg  08/19/23 16:04  08/19/23 16:59





Succinate 125 mg/ Sterile  IV  08/19/23 16:05  125 mg





Water 2 ml  STAT ONE   Administration


 


Methylprednisolone Sodium Succinate  Confirm  08/19/23 16:49 





  Methylprednis Sod Succ 125 Mg/2 Ml Vial***  Administered  08/19/23 16:50 





  Dose  





  125 mg  





  .ROUTE  





  .STK-MED ONE  


 


Sterile Water  Confirm  08/19/23 16:49 





  Water For Injection,Sterile 10 Ml Vial  Administered  08/19/23 16:50 





  Dose  





  10 ml  





  IJ  





  .STK-MED ONE  











Lab/Rad Data: 


                           Laboratory Result Diagrams





                                 08/19/23 16:10 





                                 08/19/23 16:10 





                               Laboratory Results











  08/19/23 08/19/23 08/19/23 Range/Units





  16:35 16:20 16:10 


 


WBC     (4.0-10.5)  x10^3/uL


 


RBC     (4.1-5.4)  x10^6/uL


 


Hgb     (12.0-16.0)  g/dL


 


Hct     (35-47)  %


 


MCV     ()  fL


 


MCH     (26-32)  pg


 


MCHC     (32-36)  g/dL


 


RDW     (11.5-14.0)  %


 


Plt Count     (150-450)  x10^3/uL


 


MPV     (7.5-11.0)  fL


 


Gran %     (36.0-66.0)  %


 


Immature Gran % (Auto)     (0.00-0.4)  %


 


Nucleat RBC Rel Count     (0.00-0.1)  %


 


Eos # (Auto)     (0-0.5)  x10^3/uL


 


Immature Gran # (Auto)     (0.00-0.03)  x10^3u/L


 


Absolute Lymphs (auto)     (1.0-4.6)  x10^3/uL


 


Absolute Monos (auto)     (0.0-1.3)  x10^3/uL


 


Absolute Nucleated RBC     (0.00-0.01)  x10^3u/L


 


Lymphocytes %     (24.0-44.0)  %


 


Monocytes %     (0.0-12.0)  %


 


Eosinophils %     (0.00-5.0)  %


 


Basophils %     (0.0-0.4)  %


 


Absolute Granulocytes     (1.4-6.9)  x10^3/uL


 


Basophils #     (0-0.4)  x10^3/uL


 


PT     (9.4-12.5)  SECONDS


 


INR     (0.8-3.0)  


 


APTT     (25.1-36.5)  SECONDS


 


Sodium     (137-145)  mmol/L


 


Potassium     (3.5-5.1)  mmol/L


 


Chloride     ()  mmol/L


 


Carbon Dioxide     (22-30)  mmol/L


 


Anion Gap     (5-15)  MEQ/L


 


BUN     (7-17)  mg/dL


 


Creatinine     (0.52-1.04)  mg/dL


 


Estimated GFR     ML/MIN


 


Glucose     ()  mg/dL


 


Lactic Acid  1.2    (0.4-2.0)  


 


Calcium     (8.4-10.2)  mg/dL


 


Total Bilirubin     (0.2-1.3)  mg/dL


 


AST     (14-36)  U/L


 


ALT     (0-35)  U/L


 


Alkaline Phosphatase     ()  U/L


 


Troponin I    < 0.012  (0.000-0.034)  ng/mL


 


NT-Pro-B Natriuret Pep     (<300)  pg/mL


 


Serum Total Protein     (6.3-8.2)  g/dL


 


Albumin     (3.5-5.0)  g/dL


 


Influenza Type A Ag   NEGATIVE   (NEGATIVE)  


 


Influenza Type B Ag   NEGATIVE   (NEGATIVE)  


 


RSV (PCR)   NEGATIVE   (NEGATIVE)  


 


SARS-CoV-2 (PCR)   NEGATIVE   (NEGATIVE)  














  08/19/23 08/19/23 08/19/23 Range/Units





  16:10 16:10 16:10 


 


WBC    10.2  (4.0-10.5)  x10^3/uL


 


RBC    4.07 L  (4.1-5.4)  x10^6/uL


 


Hgb    11.8 L  (12.0-16.0)  g/dL


 


Hct    37.0  (35-47)  %


 


MCV    90.9  ()  fL


 


MCH    29.0  (26-32)  pg


 


MCHC    31.9 L  (32-36)  g/dL


 


RDW    14.3 H  (11.5-14.0)  %


 


Plt Count    279  (150-450)  x10^3/uL


 


MPV    10.2  (7.5-11.0)  fL


 


Gran %    88.9 H  (36.0-66.0)  %


 


Immature Gran % (Auto)    0.9 H  (0.00-0.4)  %


 


Nucleat RBC Rel Count    0.0  (0.00-0.1)  %


 


Eos # (Auto)    0.01  (0-0.5)  x10^3/uL


 


Immature Gran # (Auto)    0.09 H  (0.00-0.03)  x10^3u/L


 


Absolute Lymphs (auto)    0.66 L  (1.0-4.6)  x10^3/uL


 


Absolute Monos (auto)    0.33  (0.0-1.3)  x10^3/uL


 


Absolute Nucleated RBC    0.00  (0.00-0.01)  x10^3u/L


 


Lymphocytes %    6.5 L  (24.0-44.0)  %


 


Monocytes %    3.2  (0.0-12.0)  %


 


Eosinophils %    0.1  (0.00-5.0)  %


 


Basophils %    0.4  (0.0-0.4)  %


 


Absolute Granulocytes    9.03 H  (1.4-6.9)  x10^3/uL


 


Basophils #    0.04  (0-0.4)  x10^3/uL


 


PT  11.4    (9.4-12.5)  SECONDS


 


INR  1.05    (0.8-3.0)  


 


APTT  28.2    (25.1-36.5)  SECONDS


 


Sodium   133 L   (137-145)  mmol/L


 


Potassium   4.2   (3.5-5.1)  mmol/L


 


Chloride   89 L   ()  mmol/L


 


Carbon Dioxide   38 H   (22-30)  mmol/L


 


Anion Gap   10.6   (5-15)  MEQ/L


 


BUN   22 H   (7-17)  mg/dL


 


Creatinine   0.73   (0.52-1.04)  mg/dL


 


Estimated GFR   > 60.0   ML/MIN


 


Glucose   144 H   ()  mg/dL


 


Lactic Acid     (0.4-2.0)  


 


Calcium   9.3   (8.4-10.2)  mg/dL


 


Total Bilirubin   1.70 H   (0.2-1.3)  mg/dL


 


AST   45 H   (14-36)  U/L


 


ALT   28   (0-35)  U/L


 


Alkaline Phosphatase   96   ()  U/L


 


Troponin I     (0.000-0.034)  ng/mL


 


NT-Pro-B Natriuret Pep   1080   (<300)  pg/mL


 


Serum Total Protein   7.0   (6.3-8.2)  g/dL


 


Albumin   4.2   (3.5-5.0)  g/dL


 


Influenza Type A Ag     (NEGATIVE)  


 


Influenza Type B Ag     (NEGATIVE)  


 


RSV (PCR)     (NEGATIVE)  


 


SARS-CoV-2 (PCR)     (NEGATIVE)  














- Progress


Progress Note: 





08/19/23 19:20


Nursing note and vital signs reviewed


No food or housing insecurities noted


Additional history per EMS


Duoneb x1/125mg IV Solumedrol w improvement


All lab results reviewed and shared w pt


CXR read in ER and result shared w pt


CT chest result pending at time of Obs admit


Obs per Dr. Haja sánchez CT chest result pending. Wants to hold antibiotics at this 

time.


Pt is a full code


08/19/23 19:26





08/19/23 19:48


CT resulted before admit/LLL infiltrate/blood cultures already done/1gm IV 

rocephin


Counseled pt/family regarding: lab results, diagnosis, rad results





Medical Desision Making





- Independent Historian


Additional History obtained from: EMS





- Discussion of managment


Care discussed with:: hospitalist


Reviewed:: Test results, Need for additional workup


Agreed on:: place in obs


Will see patient: in hospital





- Diagnostic Testing


Diagnostic test were ordered, analyzed, and reviewed by me: Yes


Radiological Interpretation: Interpreted by me





- Risk of complications


The pt has a high risk of morbidity or mortality based on: Drug therapy 

requiring intensive monitoring for toxicity





- Departure


Departure Disposition: Observation


Clinical Impression: 


 Acute exacerbation of chronic obstructive airways disease, Pneumonia





Condition: Stable


Critical Care Time: Yes


Critical Care Time(excluding separately billable procedures): Critical 30-74 

mins


Referrals: 


KRIS CEDENO [Primary Care Provider] - Follow up/PCP as directed


Instructions:  Exacerbation of COPD (DC)

## 2023-08-19 NOTE — XRAY
Indication: Short of breath.



Comparison: May 16, 2023



Portable apical lordotic again demonstrates COPD, mild bibasilar subsegmental

atelectasis/scarring, scattered right lung suture material/surgical clips, and

small benign left breast calcification.  No focal infiltrate, consolidation,

or large effusion.  Heart not enlarged.  Bony thorax intact with osteopenia,

moderate degenerative changes, old right rib fractures, and mid thoracic

vertebroplasty.



Impression: Nonacute chest with chronic features.

## 2023-08-19 NOTE — PCM.HP
History of Present Illness





- Chief Complaint


Chief Complaint: copd exacerbation 


History of Present Illness: 


 is a 73 year old female. She has a medical history significant for 

COPD, HTN, CHF who presents with SOB, wheezing and cough. Denies fevers, cough 

with sputum and chest pain. She has baseline hypoxia and uses 4 liters NC at 

home. In the room she already felt better after breathing treatments in the ED +

solumedrol. No other complaints. 








- Review of Systems


Eyes: No Symptoms


Ears, Nose, & Throat: No Symptoms


Respiratory: Short Of Breath, Wheezing


Cardiac: No Symptoms


Abdominal/Gastrointestinal: No Symptoms


Genitourinary Symptoms: No Symptoms


Musculoskeletal: No Symptoms


Skin: No Symptoms


Neurological: No Symptoms


Psychological: No Symptoms


Endocrine: No Symptoms


Hematologic/Lymphatic: No Symptoms


Immunological/Allergic: No Symptoms


All Other Systems: Reviewed and Negative





Medications & Allergies


Home Medications: 


                              Home Medication List





Albuterol Sulfate Mdi*** [ALBUTEROL/Proair Hfa MDI***] 8.5 gm IH Q4HWA PRN 

09/28/12 [History Confirmed 08/19/23]


Albuterol/Ipratropium 3ml Neb* [DUONEB 0.5-3 MG/3 ml Neb**] 3 ml IH Q4H 09/28/12

[History Confirmed 08/19/23]


Atorvastatin Calcium [Lipitor] 40 mg PO HS 09/28/12 [History Confirmed 08/19/23]


Calcium Citrate/Vitamin D3 [Citracal + D Caplet] 2 each PO BID 09/28/12 [History

 Confirmed 08/19/23]


Multivitamin [Multi-Vitamin Daily] 1 tab PO DAILY 09/29/14 [History Confirmed 

08/19/23]


Tiotropium Bromide [Spiriva] 18 mcg IH DAILY 06/27/16 [History Confirmed 

08/19/23]


Cholecalciferol (Vitamin D3) [Vitamin D3] 50 mg PO DAILY 10/18/17 [History 

Confirmed 08/19/23]


Potassium Chloride Tab* [Klor Con] 10 meq PO BID 10/18/17 [History Confirmed 

08/19/23]


Nitroglycerin Sr *** [Nitro-Bid ***] 2.5 mg PO DAILY 10/25/17 [History Confirmed

 08/19/23]


Aspirin [Aspirin EC] 81 mg PO DAILY 11/30/18 [History Confirmed 08/19/23]


Zoledronic Acid/Mannitol&Water [Reclast 5 mg/100 ml Solution] 5 mg IV UD 

01/25/21 [History Confirmed 08/19/23]


Ascorbic Acid [Vitamin C] 1,000 mg PO DAILY 03/14/22 [History Confirmed 

08/19/23]


Biotin 10,000 mcg PO DAILY 03/14/22 [History]


Furosemide 80 mg PO BID 03/14/22 [History Confirmed 08/19/23]


Magnesium Oxide [Magnesium] 250 mg PO DAILY 03/14/22 [History Confirmed 

08/19/23]


Prednisone 10 mg*** [Deltasone 10 mg***] 10 mg PO DAILY 03/14/22 [History 

Confirmed 08/19/23]








Allergies/Adverse Reactions: 


                                    Allergies











Allergy/AdvReac Type Severity Reaction Status Date / Time


 


Tetanus Vaccines and Toxoid Allergy Severe throat Verified 08/19/23 16:08





[Tetanus]   swelling  














- Past Medical History


Past Medical History: Yes


Neurological History: No Pertinent History


ENT History: Cataracts


Cardiac History: Angina, High Cholesterol, Hypertension


Respiratory History: COPD, Lung Cancer


Endocrine Medical History: No Pertinent History


Musculoskelatal History: Degenerative Disk Disease, Osteoporosis


GI Medical History: GERD


 History: No Pertinent History


Pyscho-Social History: Anxiety


Reproductive Disorders: No Pertinent History


Comment: Pt denies heart disease but she does have episodes of angina and 

follows Dr. irving every 3 to 6 months. Pt reports negative stress and that 

chest pain is a result of heart shift after right upper lobectomy





- Past Surgical History


Past Surgical History: Yes


Neuro Surgical History: No Pertinent History


Cardiac History: No Pertinent History


Respiratory Surgery: Lobectomy


GI Surgical History: Appendectomy


Genitourinary Surgical Hx: No Pertinent History


Musculskeletal Surgical Hx: Orthopedic Surgery


Female Surgical History: Hysterectomy, Tubal Ligation


Other Surgical History: Right upper lobectomy. Right thumb reconstructive 

surgery. right hand surgery. Right knee replacement.  left elbow bursa surgery, 

back surgery





- Social History


Smoking Status: Former smoker


How long have you smoked: 40 years


Exposure to second hand smoke: No


Alcohol: None


Drug Use: none


Significant Family History: no pertinent family hx





- Physical Exam


Vital Signs: 


                               Vital Signs - 24 hr











  Temp Pulse Resp BP BP Pulse Ox


 


 08/19/23 20:58   104 H  27 H    98


 


 08/19/23 20:01   112 H  30 H  133/102   93 L


 


 08/19/23 19:50       96


 


 08/19/23 19:47   107 H  27 H    97


 


 08/19/23 19:00   105 H  24  147/91  147/91  100


 


 08/19/23 18:00   100 H  24    97


 


 08/19/23 16:38   93 H  26 H    98


 


 08/19/23 15:57  98.0 F  98 H  26 H   146/99  96











General Appearance: no apparent distress


Neurologic Exam: alert, oriented x 3, cooperative, normal mood/affect, nml 

cerebellar function, nml station & gait, sensation nml, No motor deficits


Eye Exam: PERRL/EOMI, eyes nml inspection


Ears, Nose, Throat Exam: normal ENT inspection, TMs normal, pharynx normal, 

moist mucous membranes


Neck Exam: normal inspection, non-tender, supple, full range of motion


Respiratory Exam: normal breath sounds, lungs clear, No respiratory distress


Cardiovascular Exam: regular rate/rhythm, normal heart sounds, normal peripheral

 pulses


Gastrointestinal/Abdomen Exam: soft, normal bowel sounds, No tenderness, No mass


Back Exam: normal inspection, normal range of motion, No CVA tenderness, No 

vertebral tenderness


Extremity Exam: normal inspection, normal range of motion, pelvis stable


Skin Exam: normal color, warm, dry, No rash





Results





- Labs


Lab/Micro Results: 


                            Lab Results-Last 24 Hours











  08/19/23 08/19/23 08/19/23 Range/Units





  16:10 16:10 16:10 


 


WBC  10.2    (4.0-10.5)  x10^3/uL


 


RBC  4.07 L    (4.1-5.4)  x10^6/uL


 


Hgb  11.8 L    (12.0-16.0)  g/dL


 


Hct  37.0    (35-47)  %


 


MCV  90.9    ()  fL


 


MCH  29.0    (26-32)  pg


 


MCHC  31.9 L    (32-36)  g/dL


 


RDW  14.3 H    (11.5-14.0)  %


 


Plt Count  279    (150-450)  x10^3/uL


 


MPV  10.2    (7.5-11.0)  fL


 


Gran %  88.9 H    (36.0-66.0)  %


 


Immature Gran % (Auto)  0.9 H    (0.00-0.4)  %


 


Nucleat RBC Rel Count  0.0    (0.00-0.1)  %


 


Eos # (Auto)  0.01    (0-0.5)  x10^3/uL


 


Immature Gran # (Auto)  0.09 H    (0.00-0.03)  x10^3u/L


 


Absolute Lymphs (auto)  0.66 L    (1.0-4.6)  x10^3/uL


 


Absolute Monos (auto)  0.33    (0.0-1.3)  x10^3/uL


 


Absolute Nucleated RBC  0.00    (0.00-0.01)  x10^3u/L


 


Lymphocytes %  6.5 L    (24.0-44.0)  %


 


Monocytes %  3.2    (0.0-12.0)  %


 


Eosinophils %  0.1    (0.00-5.0)  %


 


Basophils %  0.4    (0.0-0.4)  %


 


Absolute Granulocytes  9.03 H    (1.4-6.9)  x10^3/uL


 


Basophils #  0.04    (0-0.4)  x10^3/uL


 


PT    11.4  (9.4-12.5)  SECONDS


 


INR    1.05  (0.8-3.0)  


 


APTT    28.2  (25.1-36.5)  SECONDS


 


Sodium   133 L   (137-145)  mmol/L


 


Potassium   4.2   (3.5-5.1)  mmol/L


 


Chloride   89 L   ()  mmol/L


 


Carbon Dioxide   38 H   (22-30)  mmol/L


 


Anion Gap   10.6   (5-15)  MEQ/L


 


BUN   22 H   (7-17)  mg/dL


 


Creatinine   0.73   (0.52-1.04)  mg/dL


 


Estimated GFR   > 60.0   ML/MIN


 


Glucose   144 H   ()  mg/dL


 


Lactic Acid     (0.4-2.0)  


 


Calcium   9.3   (8.4-10.2)  mg/dL


 


Total Bilirubin   1.70 H   (0.2-1.3)  mg/dL


 


AST   45 H   (14-36)  U/L


 


ALT   28   (0-35)  U/L


 


Alkaline Phosphatase   96   ()  U/L


 


Troponin I     (0.000-0.034)  ng/mL


 


NT-Pro-B Natriuret Pep   1080   (<300)  pg/mL


 


Serum Total Protein   7.0   (6.3-8.2)  g/dL


 


Albumin   4.2   (3.5-5.0)  g/dL


 


Influenza Type A Ag     (NEGATIVE)  


 


Influenza Type B Ag     (NEGATIVE)  


 


RSV (PCR)     (NEGATIVE)  


 


SARS-CoV-2 (PCR)     (NEGATIVE)  














  08/19/23 08/19/23 08/19/23 Range/Units





  16:10 16:20 16:35 


 


WBC     (4.0-10.5)  x10^3/uL


 


RBC     (4.1-5.4)  x10^6/uL


 


Hgb     (12.0-16.0)  g/dL


 


Hct     (35-47)  %


 


MCV     ()  fL


 


MCH     (26-32)  pg


 


MCHC     (32-36)  g/dL


 


RDW     (11.5-14.0)  %


 


Plt Count     (150-450)  x10^3/uL


 


MPV     (7.5-11.0)  fL


 


Gran %     (36.0-66.0)  %


 


Immature Gran % (Auto)     (0.00-0.4)  %


 


Nucleat RBC Rel Count     (0.00-0.1)  %


 


Eos # (Auto)     (0-0.5)  x10^3/uL


 


Immature Gran # (Auto)     (0.00-0.03)  x10^3u/L


 


Absolute Lymphs (auto)     (1.0-4.6)  x10^3/uL


 


Absolute Monos (auto)     (0.0-1.3)  x10^3/uL


 


Absolute Nucleated RBC     (0.00-0.01)  x10^3u/L


 


Lymphocytes %     (24.0-44.0)  %


 


Monocytes %     (0.0-12.0)  %


 


Eosinophils %     (0.00-5.0)  %


 


Basophils %     (0.0-0.4)  %


 


Absolute Granulocytes     (1.4-6.9)  x10^3/uL


 


Basophils #     (0-0.4)  x10^3/uL


 


PT     (9.4-12.5)  SECONDS


 


INR     (0.8-3.0)  


 


APTT     (25.1-36.5)  SECONDS


 


Sodium     (137-145)  mmol/L


 


Potassium     (3.5-5.1)  mmol/L


 


Chloride     ()  mmol/L


 


Carbon Dioxide     (22-30)  mmol/L


 


Anion Gap     (5-15)  MEQ/L


 


BUN     (7-17)  mg/dL


 


Creatinine     (0.52-1.04)  mg/dL


 


Estimated GFR     ML/MIN


 


Glucose     ()  mg/dL


 


Lactic Acid    1.2  (0.4-2.0)  


 


Calcium     (8.4-10.2)  mg/dL


 


Total Bilirubin     (0.2-1.3)  mg/dL


 


AST     (14-36)  U/L


 


ALT     (0-35)  U/L


 


Alkaline Phosphatase     ()  U/L


 


Troponin I  < 0.012    (0.000-0.034)  ng/mL


 


NT-Pro-B Natriuret Pep     (<300)  pg/mL


 


Serum Total Protein     (6.3-8.2)  g/dL


 


Albumin     (3.5-5.0)  g/dL


 


Influenza Type A Ag   NEGATIVE   (NEGATIVE)  


 


Influenza Type B Ag   NEGATIVE   (NEGATIVE)  


 


RSV (PCR)   NEGATIVE   (NEGATIVE)  


 


SARS-CoV-2 (PCR)   NEGATIVE   (NEGATIVE)  














  08/19/23 Range/Units





  19:52 


 


WBC   (4.0-10.5)  x10^3/uL


 


RBC   (4.1-5.4)  x10^6/uL


 


Hgb   (12.0-16.0)  g/dL


 


Hct   (35-47)  %


 


MCV   ()  fL


 


MCH   (26-32)  pg


 


MCHC   (32-36)  g/dL


 


RDW   (11.5-14.0)  %


 


Plt Count   (150-450)  x10^3/uL


 


MPV   (7.5-11.0)  fL


 


Gran %   (36.0-66.0)  %


 


Immature Gran % (Auto)   (0.00-0.4)  %


 


Nucleat RBC Rel Count   (0.00-0.1)  %


 


Eos # (Auto)   (0-0.5)  x10^3/uL


 


Immature Gran # (Auto)   (0.00-0.03)  x10^3u/L


 


Absolute Lymphs (auto)   (1.0-4.6)  x10^3/uL


 


Absolute Monos (auto)   (0.0-1.3)  x10^3/uL


 


Absolute Nucleated RBC   (0.00-0.01)  x10^3u/L


 


Lymphocytes %   (24.0-44.0)  %


 


Monocytes %   (0.0-12.0)  %


 


Eosinophils %   (0.00-5.0)  %


 


Basophils %   (0.0-0.4)  %


 


Absolute Granulocytes   (1.4-6.9)  x10^3/uL


 


Basophils #   (0-0.4)  x10^3/uL


 


PT   (9.4-12.5)  SECONDS


 


INR   (0.8-3.0)  


 


APTT   (25.1-36.5)  SECONDS


 


Sodium   (137-145)  mmol/L


 


Potassium   (3.5-5.1)  mmol/L


 


Chloride   ()  mmol/L


 


Carbon Dioxide   (22-30)  mmol/L


 


Anion Gap   (5-15)  MEQ/L


 


BUN   (7-17)  mg/dL


 


Creatinine   (0.52-1.04)  mg/dL


 


Estimated GFR   ML/MIN


 


Glucose   ()  mg/dL


 


Lactic Acid   (0.4-2.0)  


 


Calcium   (8.4-10.2)  mg/dL


 


Total Bilirubin   (0.2-1.3)  mg/dL


 


AST   (14-36)  U/L


 


ALT   (0-35)  U/L


 


Alkaline Phosphatase   ()  U/L


 


Troponin I  < 0.012  (0.000-0.034)  ng/mL


 


NT-Pro-B Natriuret Pep   (<300)  pg/mL


 


Serum Total Protein   (6.3-8.2)  g/dL


 


Albumin   (3.5-5.0)  g/dL


 


Influenza Type A Ag   (NEGATIVE)  


 


Influenza Type B Ag   (NEGATIVE)  


 


RSV (PCR)   (NEGATIVE)  


 


SARS-CoV-2 (PCR)   (NEGATIVE)  














- Radiology Impressions


Radiology Exams & Impressions: 


                              Radiology Procedures











 Category Date Time Status


 


 CHEST 1 VIEW (PORTABLE) Stat Exams  08/19/23 16:05 Completed


 


 CHEST WITHOUT CONTRAST [CT] Stat Exams  08/19/23 17:30 Completed














- Other Procedures and Tests


                               Respiratory Therapy





08/19/23 19:16


Oxygen Nasal Cannula 4 lpm 














Assessment/Plan


(1) Acute exacerbation of chronic obstructive airways disease


Current Visit: Yes   Status: Acute   


Assessment & Plan: 


1. Prednisone 40 mg daily


2. Duonebs 


3. Hold Abx for now 


Code(s): J44.1 - CHRONIC OBSTRUCTIVE PULMONARY DISEASE W (ACUTE) EXACERBATION   





Telemedicine Encounter





- Telemedicine Encounter


Telemedicine Encounter: 


The entirety of this encounter was performed via Telemedicine"

## 2023-08-19 NOTE — XRAY
CLINICAL HISTORY:Abnormal CXR

COMPARISON:None.

TECHNIQUE:Contiguous 5.0 mm axial CT images of the chest were acquired without

the administration of intravenous contrast. Coronal and sagittal

reconstructions were obtained.

FINDINGS:

The visualized thyroid gland appears unremarkable.

No pathologically enlarged axillary or mediastinal lymph nodes.

Limited evaluation of the hilar lymph nodes due to lack of IV contrast.

Atherosclerotic calcification of the coronary vessels, aortic arch, and

abdominal aorta. Tortuous thoracic aorta.

Both lungs demonstrate emphysematous changes.

There is hyperinflation of the left lung with trans mediastinal herniation.

Bilateral lower lobes show mild centriacinar emphysema.

Small focal consolidation is seen in the left lower lobe SE 3 image #31.

The right upper lobe appears small and unremarkable.

No solitary pulmonary nodules were seen.

Included section of the upper abdomen demonstrates unremarkable liver

gallbladder spleen, pancreas, adrenals, and kidneys.

Visualized osseous structures show marked degenerative changes of the thoracic

spine with kyphotic deformity and compression fracture with reduced vertical

height of T6, T7, T9, T11, and L1 vertebra.

IMPRESSION:

1. Emphysematous lungs with mild centriacinar emphysema in both lower lobes.

There is hyperinflation of the left lung with trans mediastinal herniation.

2. Small focal consolidation is seen in the left lower lobe SE 3 image #31.

3. No soliatry pulmonary nodule or definite lymphadenopathy to suggest

metastases.

4. Marked degenerative changes of the thoracic spine with kyphotic deformity

and compression fracture with reduced vertical heights of T6, T7, T9, T11, and

L1 vertebra.



_____________________________________





Electronically Signed by: Janey Dyer MD. (08/19/2023 18:30:37 CST)

## 2023-08-20 LAB
ALBUMIN SERPL-MCNC: 3.9 G/DL (ref 3.5–5)
ALP SERPL-CCNC: 84 U/L (ref 38–126)
ALT SERPL-CCNC: 33 U/L (ref 0–35)
ANION GAP SERPL CALC-SCNC: 8.2 MEQ/L (ref 5–15)
AST SERPL QL: 56 U/L (ref 14–36)
BILIRUB BLD-MCNC: 1.1 MG/DL (ref 0.2–1.3)
BUN SERPL-MCNC: 25 MG/DL (ref 7–17)
CALCIUM SPEC-MCNC: 8.5 MG/DL (ref 8.4–10.2)
CHLORIDE SERPL-SCNC: 94 MMOL/L (ref 98–107)
CO2 SERPL-SCNC: 39 MMOL/L (ref 22–30)
CREAT SERPL-MCNC: 0.83 MG/DL (ref 0.52–1.04)
GFR SERPLBLD BASED ON 1.73 SQ M-ARVRAT: > 60 ML/MIN
GLUCOSE SERPL-MCNC: 138 MG/DL (ref 74–106)
HCT VFR BLD AUTO: 35 % (ref 35–47)
HGB BLD-MCNC: 11.1 G/DL (ref 12–16)
MCH RBC QN AUTO: 29.1 PG (ref 26–32)
MCHC RBC AUTO-ENTMCNC: 31.7 G/DL (ref 32–36)
PLATELET # BLD AUTO: 287 X10^3/UL (ref 150–450)
POTASSIUM SERPLBLD-SCNC: 3.9 MMOL/L (ref 3.5–5.1)
PROT SERPL-MCNC: 6.5 G/DL (ref 6.3–8.2)
RBC # BLD AUTO: 3.81 X10^6/UL (ref 4.1–5.4)
SODIUM SERPL-SCNC: 137 MMOL/L (ref 137–145)
WBC # BLD AUTO: 18.8 X10^3/UL (ref 4–10.5)

## 2023-08-20 RX ADMIN — FUROSEMIDE SCH MG: 40 TABLET ORAL at 10:00

## 2023-08-20 RX ADMIN — ENOXAPARIN SODIUM SCH MG: 100 INJECTION SUBCUTANEOUS at 13:42

## 2023-08-20 RX ADMIN — BUSPIRONE HYDROCHLORIDE SCH: 5 TABLET ORAL at 21:59

## 2023-08-20 RX ADMIN — SIMVASTATIN SCH MG: 20 TABLET, FILM COATED ORAL at 21:40

## 2023-08-20 RX ADMIN — IPRATROPIUM BROMIDE AND ALBUTEROL SULFATE SCH ML: .5; 3 SOLUTION RESPIRATORY (INHALATION) at 19:15

## 2023-08-20 RX ADMIN — FUROSEMIDE SCH MG: 40 TABLET ORAL at 16:36

## 2023-08-20 RX ADMIN — IPRATROPIUM BROMIDE AND ALBUTEROL SULFATE SCH ML: .5; 3 SOLUTION RESPIRATORY (INHALATION) at 22:20

## 2023-08-20 RX ADMIN — POTASSIUM CHLORIDE SCH MEQ: 10 TABLET, EXTENDED RELEASE ORAL at 10:01

## 2023-08-20 RX ADMIN — IPRATROPIUM BROMIDE AND ALBUTEROL SULFATE SCH ML: .5; 3 SOLUTION RESPIRATORY (INHALATION) at 11:03

## 2023-08-20 RX ADMIN — CARVEDILOL SCH MG: 12.5 TABLET, FILM COATED ORAL at 10:00

## 2023-08-20 RX ADMIN — WATER SCH MG: 1 INJECTION INTRAMUSCULAR; INTRAVENOUS; SUBCUTANEOUS at 13:42

## 2023-08-20 RX ADMIN — IPRATROPIUM BROMIDE AND ALBUTEROL SULFATE SCH ML: .5; 3 SOLUTION RESPIRATORY (INHALATION) at 15:00

## 2023-08-20 RX ADMIN — ASPIRIN SCH MG: 81 TABLET, COATED ORAL at 10:01

## 2023-08-20 RX ADMIN — NITROGLYCERIN SCH MG: 2.5 CAPSULE ORAL at 10:01

## 2023-08-20 RX ADMIN — AZITHROMYCIN DIHYDRATE SCH MLS/HR: 500 INJECTION, POWDER, LYOPHILIZED, FOR SOLUTION INTRAVENOUS at 10:01

## 2023-08-20 RX ADMIN — IPRATROPIUM BROMIDE AND ALBUTEROL SULFATE SCH ML: .5; 3 SOLUTION RESPIRATORY (INHALATION) at 03:34

## 2023-08-20 RX ADMIN — CEFTRIAXONE SCH MLS/HR: 1 INJECTION, SOLUTION INTRAVENOUS at 21:47

## 2023-08-20 RX ADMIN — POLYVINYL ALCOHOL SCH ML: 14 SOLUTION/ DROPS OPHTHALMIC at 21:41

## 2023-08-20 RX ADMIN — POLYVINYL ALCOHOL SCH ML: 14 SOLUTION/ DROPS OPHTHALMIC at 16:39

## 2023-08-20 RX ADMIN — CARVEDILOL SCH MG: 12.5 TABLET, FILM COATED ORAL at 21:40

## 2023-08-20 RX ADMIN — WATER SCH MG: 1 INJECTION INTRAMUSCULAR; INTRAVENOUS; SUBCUTANEOUS at 21:42

## 2023-08-20 RX ADMIN — BUSPIRONE HYDROCHLORIDE SCH MG: 5 TABLET ORAL at 10:00

## 2023-08-20 RX ADMIN — NAPROXEN PRN MG: 500 TABLET ORAL at 21:41

## 2023-08-20 RX ADMIN — POTASSIUM CHLORIDE SCH MEQ: 10 TABLET, EXTENDED RELEASE ORAL at 21:41

## 2023-08-20 RX ADMIN — IPRATROPIUM BROMIDE AND ALBUTEROL SULFATE SCH ML: .5; 3 SOLUTION RESPIRATORY (INHALATION) at 07:13

## 2023-08-20 RX ADMIN — MAGNESIUM OXIDE TAB 400 MG (241.3 MG ELEMENTAL MG) SCH MG: 400 (241.3 MG) TAB at 10:00

## 2023-08-20 RX ADMIN — TIOTROPIUM BROMIDE SCH EA: 18 CAPSULE ORAL; RESPIRATORY (INHALATION) at 07:20

## 2023-08-20 RX ADMIN — ALPRAZOLAM PRN MG: 1 TABLET ORAL at 21:41

## 2023-08-20 RX ADMIN — NAPROXEN PRN MG: 500 TABLET ORAL at 07:40

## 2023-08-20 RX ADMIN — TIOTROPIUM BROMIDE SCH: 18 CAPSULE ORAL; RESPIRATORY (INHALATION) at 11:25

## 2023-08-20 NOTE — PCM.NOTE
Date and Time: 08/20/23  1221





Subjective Assessment: 





8/20/23


 is a 73 year old female. She has a medical history significant for 

COPD, HTN, CHF who presents with SOB, wheezing and cough.  She was admitted for 

pneumonia and COPD exacerbation. Denies fevers, cough with sputum and chest 

pain. She has baseline hypoxia and uses 4 liters NC at home. She has increased 

SOB and either leans over bedside table or tripods to breath. Will increase 

steroids. Continue Rocephin and Zithromax. RT evaluated and breathing tx 

increased. Will continue Lasix 40mg BID as BNP 1,080. Pt has a baker in place as

she is so SOB and cannot get up and walk. She does c/o of some lower back pain 

and CT results reviewed with pt, shows degenerative changes. She denies abd. 

pain, N/V/D.   





- Review of Systems


Constitutional: No Fever, No Chills


Eyes: No Symptoms


Ears, Nose, & Throat: No Symptoms


Respiratory: Cough, Short Of Breath, Wheezing, Other


Cardiac: No Chest Pain, No Edema, No Syncope


Abdominal/Gastrointestinal: No Abdominal Pain, No Nausea, No Vomiting, No 

Diarrhea


Genitourinary Symptoms: No Dysuria


Musculoskeletal: No Back Pain, No Neck Pain


Skin: No Rash


Neurological: No Dizziness, No Focal Weakness, No Sensory Changes


Psychological: No Symptoms


Endocrine: No Symptoms


Hematologic/Lymphatic: No Symptoms


Immunological/Allergic: No Symptoms





Objective Exam


General Appearance: moderate distress, alert, thin


Neurologic Exam: alert, oriented x 3, cooperative, normal mood/affect, nml 

cerebellar function, sensation nml, No motor deficits


Skin Exam: normal color, warm, dry


Eye Exam: PERRL, EOMI, eyes nml inspection


Ears, Nose, Throat Exam: normal ENT inspection, pharynx normal, moist mucous 

membranes


Neck Exam: normal inspection, non-tender, supple, full range of motion


Respiratory Exam: respiratory distress, crackles/rales, wheezing


Cardiovascular Exam: regular rate/rhythm, normal heart sounds


Gastrointestinal/Abdomen Exam: soft, No tenderness, No mass


Extremity Exam: normal inspection, normal range of motion


Back Exam: normal inspection, normal range of motion, No CVA tenderness, No 

vertebral tenderness


Pelvic Exam: deferred


Rectal Exam: deferred





OBJECTIVE DATA


Vital Signs: 


                               Vital Signs - 24 hr











  Temp Pulse Resp BP BP Pulse Ox


 


 08/20/23 12:00  97.9 F  80  16   114/58  99


 


 08/20/23 11:29   85  22    95


 


 08/20/23 07:22   95 H  22    95


 


 08/20/23 06:40  97.6 F  95 H  16   112/72  95


 


 08/20/23 03:35  97.9 F  99 H  20   133/91  97


 


 08/20/23 03:34   96 H  20    97


 


 08/19/23 23:44   103 H  20    97


 


 08/19/23 22:10  98.2 F  99 H  20   149/75  96


 


 08/19/23 20:58   104 H  27 H    98


 


 08/19/23 20:01   112 H  30 H  133/102   93 L


 


 08/19/23 19:50       96


 


 08/19/23 19:47   107 H  27 H    97


 


 08/19/23 19:00   105 H  24  147/91  147/91  100


 


 08/19/23 18:00   100 H  24    97


 


 08/19/23 16:38   93 H  26 H    98


 


 08/19/23 15:57  98.0 F  98 H  26 H   146/99  96








                        Pain Assessment - Last Documented











Pain Intensity                 6











Intake and Output: 


                                 Intake & Output











 08/18/23 08/19/23 08/20/23 08/21/23





 11:59 11:59 11:59 11:59


 


Intake Total   860 


 


Output Total   1900 


 


Balance   -1040 


 


Weight   54.9 kg 











Lab Results: 


                            Lab Results-Last 24 Hours











  08/19/23 08/19/23 08/19/23 Range/Units





  16:10 16:10 16:10 


 


WBC  10.2    (4.0-10.5)  x10^3/uL


 


RBC  4.07 L    (4.1-5.4)  x10^6/uL


 


Hgb  11.8 L    (12.0-16.0)  g/dL


 


Hct  37.0    (35-47)  %


 


MCV  90.9    ()  fL


 


MCH  29.0    (26-32)  pg


 


MCHC  31.9 L    (32-36)  g/dL


 


RDW  14.3 H    (11.5-14.0)  %


 


Plt Count  279    (150-450)  x10^3/uL


 


MPV  10.2    (7.5-11.0)  fL


 


Gran %  88.9 H    (36.0-66.0)  %


 


Immature Gran % (Auto)  0.9 H    (0.00-0.4)  %


 


Nucleat RBC Rel Count  0.0    (0.00-0.1)  %


 


Eos # (Auto)  0.01    (0-0.5)  x10^3/uL


 


Immature Gran # (Auto)  0.09 H    (0.00-0.03)  x10^3u/L


 


Absolute Lymphs (auto)  0.66 L    (1.0-4.6)  x10^3/uL


 


Absolute Monos (auto)  0.33    (0.0-1.3)  x10^3/uL


 


Absolute Nucleated RBC  0.00    (0.00-0.01)  x10^3u/L


 


Lymphocytes %  6.5 L    (24.0-44.0)  %


 


Monocytes %  3.2    (0.0-12.0)  %


 


Eosinophils %  0.1    (0.00-5.0)  %


 


Basophils %  0.4    (0.0-0.4)  %


 


Absolute Granulocytes  9.03 H    (1.4-6.9)  x10^3/uL


 


Basophils #  0.04    (0-0.4)  x10^3/uL


 


PT    11.4  (9.4-12.5)  SECONDS


 


INR    1.05  (0.8-3.0)  


 


APTT    28.2  (25.1-36.5)  SECONDS


 


Sodium   133 L   (137-145)  mmol/L


 


Potassium   4.2   (3.5-5.1)  mmol/L


 


Chloride   89 L   ()  mmol/L


 


Carbon Dioxide   38 H   (22-30)  mmol/L


 


Anion Gap   10.6   (5-15)  MEQ/L


 


BUN   22 H   (7-17)  mg/dL


 


Creatinine   0.73   (0.52-1.04)  mg/dL


 


Estimated GFR   > 60.0   ML/MIN


 


Glucose   144 H   ()  mg/dL


 


Lactic Acid     (0.4-2.0)  


 


Calcium   9.3   (8.4-10.2)  mg/dL


 


Total Bilirubin   1.70 H   (0.2-1.3)  mg/dL


 


AST   45 H   (14-36)  U/L


 


ALT   28   (0-35)  U/L


 


Alkaline Phosphatase   96   ()  U/L


 


Troponin I     (0.000-0.034)  ng/mL


 


NT-Pro-B Natriuret Pep   1080   (<300)  pg/mL


 


Serum Total Protein   7.0   (6.3-8.2)  g/dL


 


Albumin   4.2   (3.5-5.0)  g/dL


 


Urine Color     (Yellow)  


 


Urine Appearance     (Clear)  


 


Urine pH     (4.6-8.0)  


 


Ur Specific Gravity     (1.005-1.030)  


 


Urine Protein     (Negative)  


 


Urine Glucose (UA)     (Negative)  mg/dL


 


Urine Ketones     (Negative)  


 


Urine Blood     (Negative)  


 


Urine Nitrite     (Negative)  


 


Urine Bilirubin     (Negative)  


 


Urine Urobilinogen     (0.2)  mg/dL


 


Ur Leukocyte Esterase     (Negative)  


 


U Hyaline Cast (Auto)     (0-2)  /LPF


 


Urine Microscopic RBC     (0-5)  /HPF


 


Urine Microscopic WBC     (0-5)  /HPF


 


Ur Epithelial Cells     (None Seen)  /HPF


 


Urine Bacteria     (None Seen)  /HPF


 


Urine Culture Reflexed     (NO)  


 


Influenza Type A Ag     (NEGATIVE)  


 


Influenza Type B Ag     (NEGATIVE)  


 


RSV (PCR)     (NEGATIVE)  


 


SARS-CoV-2 (PCR)     (NEGATIVE)  














  08/19/23 08/19/23 08/19/23 Range/Units





  16:10 16:20 16:35 


 


WBC     (4.0-10.5)  x10^3/uL


 


RBC     (4.1-5.4)  x10^6/uL


 


Hgb     (12.0-16.0)  g/dL


 


Hct     (35-47)  %


 


MCV     ()  fL


 


MCH     (26-32)  pg


 


MCHC     (32-36)  g/dL


 


RDW     (11.5-14.0)  %


 


Plt Count     (150-450)  x10^3/uL


 


MPV     (7.5-11.0)  fL


 


Gran %     (36.0-66.0)  %


 


Immature Gran % (Auto)     (0.00-0.4)  %


 


Nucleat RBC Rel Count     (0.00-0.1)  %


 


Eos # (Auto)     (0-0.5)  x10^3/uL


 


Immature Gran # (Auto)     (0.00-0.03)  x10^3u/L


 


Absolute Lymphs (auto)     (1.0-4.6)  x10^3/uL


 


Absolute Monos (auto)     (0.0-1.3)  x10^3/uL


 


Absolute Nucleated RBC     (0.00-0.01)  x10^3u/L


 


Lymphocytes %     (24.0-44.0)  %


 


Monocytes %     (0.0-12.0)  %


 


Eosinophils %     (0.00-5.0)  %


 


Basophils %     (0.0-0.4)  %


 


Absolute Granulocytes     (1.4-6.9)  x10^3/uL


 


Basophils #     (0-0.4)  x10^3/uL


 


PT     (9.4-12.5)  SECONDS


 


INR     (0.8-3.0)  


 


APTT     (25.1-36.5)  SECONDS


 


Sodium     (137-145)  mmol/L


 


Potassium     (3.5-5.1)  mmol/L


 


Chloride     ()  mmol/L


 


Carbon Dioxide     (22-30)  mmol/L


 


Anion Gap     (5-15)  MEQ/L


 


BUN     (7-17)  mg/dL


 


Creatinine     (0.52-1.04)  mg/dL


 


Estimated GFR     ML/MIN


 


Glucose     ()  mg/dL


 


Lactic Acid    1.2  (0.4-2.0)  


 


Calcium     (8.4-10.2)  mg/dL


 


Total Bilirubin     (0.2-1.3)  mg/dL


 


AST     (14-36)  U/L


 


ALT     (0-35)  U/L


 


Alkaline Phosphatase     ()  U/L


 


Troponin I  < 0.012    (0.000-0.034)  ng/mL


 


NT-Pro-B Natriuret Pep     (<300)  pg/mL


 


Serum Total Protein     (6.3-8.2)  g/dL


 


Albumin     (3.5-5.0)  g/dL


 


Urine Color     (Yellow)  


 


Urine Appearance     (Clear)  


 


Urine pH     (4.6-8.0)  


 


Ur Specific Gravity     (1.005-1.030)  


 


Urine Protein     (Negative)  


 


Urine Glucose (UA)     (Negative)  mg/dL


 


Urine Ketones     (Negative)  


 


Urine Blood     (Negative)  


 


Urine Nitrite     (Negative)  


 


Urine Bilirubin     (Negative)  


 


Urine Urobilinogen     (0.2)  mg/dL


 


Ur Leukocyte Esterase     (Negative)  


 


U Hyaline Cast (Auto)     (0-2)  /LPF


 


Urine Microscopic RBC     (0-5)  /HPF


 


Urine Microscopic WBC     (0-5)  /HPF


 


Ur Epithelial Cells     (None Seen)  /HPF


 


Urine Bacteria     (None Seen)  /HPF


 


Urine Culture Reflexed     (NO)  


 


Influenza Type A Ag   NEGATIVE   (NEGATIVE)  


 


Influenza Type B Ag   NEGATIVE   (NEGATIVE)  


 


RSV (PCR)   NEGATIVE   (NEGATIVE)  


 


SARS-CoV-2 (PCR)   NEGATIVE   (NEGATIVE)  














  08/19/23 08/19/23 08/20/23 Range/Units





  19:52 21:33 00:24 


 


WBC     (4.0-10.5)  x10^3/uL


 


RBC     (4.1-5.4)  x10^6/uL


 


Hgb     (12.0-16.0)  g/dL


 


Hct     (35-47)  %


 


MCV     ()  fL


 


MCH     (26-32)  pg


 


MCHC     (32-36)  g/dL


 


RDW     (11.5-14.0)  %


 


Plt Count     (150-450)  x10^3/uL


 


MPV     (7.5-11.0)  fL


 


Gran %     (36.0-66.0)  %


 


Immature Gran % (Auto)     (0.00-0.4)  %


 


Nucleat RBC Rel Count     (0.00-0.1)  %


 


Eos # (Auto)     (0-0.5)  x10^3/uL


 


Immature Gran # (Auto)     (0.00-0.03)  x10^3u/L


 


Absolute Lymphs (auto)     (1.0-4.6)  x10^3/uL


 


Absolute Monos (auto)     (0.0-1.3)  x10^3/uL


 


Absolute Nucleated RBC     (0.00-0.01)  x10^3u/L


 


Lymphocytes %     (24.0-44.0)  %


 


Monocytes %     (0.0-12.0)  %


 


Eosinophils %     (0.00-5.0)  %


 


Basophils %     (0.0-0.4)  %


 


Absolute Granulocytes     (1.4-6.9)  x10^3/uL


 


Basophils #     (0-0.4)  x10^3/uL


 


PT     (9.4-12.5)  SECONDS


 


INR     (0.8-3.0)  


 


APTT     (25.1-36.5)  SECONDS


 


Sodium     (137-145)  mmol/L


 


Potassium     (3.5-5.1)  mmol/L


 


Chloride     ()  mmol/L


 


Carbon Dioxide     (22-30)  mmol/L


 


Anion Gap     (5-15)  MEQ/L


 


BUN     (7-17)  mg/dL


 


Creatinine     (0.52-1.04)  mg/dL


 


Estimated GFR     ML/MIN


 


Glucose     ()  mg/dL


 


Lactic Acid     (0.4-2.0)  


 


Calcium     (8.4-10.2)  mg/dL


 


Total Bilirubin     (0.2-1.3)  mg/dL


 


AST     (14-36)  U/L


 


ALT     (0-35)  U/L


 


Alkaline Phosphatase     ()  U/L


 


Troponin I  < 0.012   0.014  (0.000-0.034)  ng/mL


 


NT-Pro-B Natriuret Pep     (<300)  pg/mL


 


Serum Total Protein     (6.3-8.2)  g/dL


 


Albumin     (3.5-5.0)  g/dL


 


Urine Color   Yellow   (Yellow)  


 


Urine Appearance   Clear   (Clear)  


 


Urine pH   6.0   (4.6-8.0)  


 


Ur Specific Gravity   1.010   (1.005-1.030)  


 


Urine Protein   Negative   (Negative)  


 


Urine Glucose (UA)   Negative   (Negative)  mg/dL


 


Urine Ketones   Negative   (Negative)  


 


Urine Blood   Negative   (Negative)  


 


Urine Nitrite   Negative   (Negative)  


 


Urine Bilirubin   Negative   (Negative)  


 


Urine Urobilinogen   1.0 A   (0.2)  mg/dL


 


Ur Leukocyte Esterase   Negative   (Negative)  


 


U Hyaline Cast (Auto)   NONE SEEN   (0-2)  /LPF


 


Urine Microscopic RBC   0-2   (0-5)  /HPF


 


Urine Microscopic WBC   0-2   (0-5)  /HPF


 


Ur Epithelial Cells   None Seen   (None Seen)  /HPF


 


Urine Bacteria   None Seen   (None Seen)  /HPF


 


Urine Culture Reflexed   ORDERED SEPARATELY   (NO)  


 


Influenza Type A Ag     (NEGATIVE)  


 


Influenza Type B Ag     (NEGATIVE)  


 


RSV (PCR)     (NEGATIVE)  


 


SARS-CoV-2 (PCR)     (NEGATIVE)  











Radiology Exams: 


                              Radiology Procedures











 Category Date Time Status


 


 CHEST 1 VIEW (PORTABLE) Stat Exams  08/19/23 16:05 Completed


 


 CHEST WITHOUT CONTRAST [CT] Stat Exams  08/19/23 17:30 Completed














Assessment/Plan


(1) Acute exacerbation of chronic obstructive airways disease


Current Visit: Yes   Status: Acute   


Assessment & Plan: 


- methylprednisone 125mg Q8


- Duonebs 


- Spirivia


- BL 4LNC


- Currently on 4LNC


Code(s): J44.1 - CHRONIC OBSTRUCTIVE PULMONARY DISEASE W (ACUTE) EXACERBATION   





(2) Pneumonia


Current Visit: Yes   Status: Acute   


Assessment & Plan: 


Rocephin, Zithromax


- Chest XR: 8/19


Nonacute chest with chronic features


- CT chest: 8/19


1. Emphysematous lungs with mild centriacinar emphysema in both lower lobes.


There is hyperinflation of the left lung with trans mediastinal herniation.


2. Small focal consolidation is seen in the left lower lobe SE 3 image #31.


3. No soliatry pulmonary nodule or definite lymphadenopathy to suggest


metastases.


4. Marked degenerative changes of the thoracic spine with kyphotic deformity


and compression fracture with reduced vertical heights of T6, T7, T9, T11, and


L1 vertebra.


Code(s): J18.9 - PNEUMONIA, UNSPECIFIED ORGANISM   





(3) Back pain, acute


Current Visit: No   Status: Acute   


Assessment & Plan: 


- Degenerative changes as seen per CT


- Naprosyn 


Code(s): M54.9 - DORSALGIA, UNSPECIFIED   





(4) CHF (congestive heart failure)


Current Visit: No   Status: Acute   


Qualifiers: 


   Heart failure type: systolic   Heart failure chronicity: acute on chronic   

Qualified Code(s): I50.23 - Acute on chronic systolic (congestive) heart failure

   


Assessment & Plan: 


- Chronic


- Continue Lasix 40mg BID


Code(s): I50.9 - HEART FAILURE, UNSPECIFIED   





(5) COPD (chronic obstructive pulmonary disease)


Current Visit: No   Status: Acute   


Qualifiers: 


   COPD type: unspecified COPD   Qualified Code(s): J44.9 - Chronic obstructive 

pulmonary disease, unspecified   


Assessment & Plan: 


- acute on chronic


- pt on baseline oxygen

















VTE: Lovenox


D/C plan: 1-2 days


Next of kin: Darrell Dixon [phone]

## 2023-08-21 LAB
ALBUMIN SERPL-MCNC: 3.5 G/DL (ref 3.5–5)
ALP SERPL-CCNC: 74 U/L (ref 38–126)
ALT SERPL-CCNC: 30 U/L (ref 0–35)
ANION GAP SERPL CALC-SCNC: 11.9 MEQ/L (ref 5–15)
AST SERPL QL: 51 U/L (ref 14–36)
BILIRUB BLD-MCNC: 0.6 MG/DL (ref 0.2–1.3)
BUN SERPL-MCNC: 25 MG/DL (ref 7–17)
CALCIUM SPEC-MCNC: 7.6 MG/DL (ref 8.4–10.2)
CHLORIDE SERPL-SCNC: 96 MMOL/L (ref 98–107)
CO2 SERPL-SCNC: 36 MMOL/L (ref 22–30)
CREAT SERPL-MCNC: 0.81 MG/DL (ref 0.52–1.04)
GFR SERPLBLD BASED ON 1.73 SQ M-ARVRAT: > 60 ML/MIN
GLUCOSE SERPL-MCNC: 140 MG/DL (ref 74–106)
HCT VFR BLD AUTO: 34.8 % (ref 35–47)
HGB BLD-MCNC: 11 G/DL (ref 12–16)
MCH RBC QN AUTO: 29.1 PG (ref 26–32)
MCHC RBC AUTO-ENTMCNC: 31.6 G/DL (ref 32–36)
PLATELET # BLD AUTO: 256 X10^3/UL (ref 150–450)
POTASSIUM SERPLBLD-SCNC: 3.9 MMOL/L (ref 3.5–5.1)
PROT SERPL-MCNC: 6 G/DL (ref 6.3–8.2)
RBC # BLD AUTO: 3.78 X10^6/UL (ref 4.1–5.4)
SODIUM SERPL-SCNC: 140 MMOL/L (ref 137–145)
WBC # BLD AUTO: 12.8 X10^3/UL (ref 4–10.5)

## 2023-08-21 RX ADMIN — POLYVINYL ALCOHOL SCH ML: 14 SOLUTION/ DROPS OPHTHALMIC at 10:03

## 2023-08-21 RX ADMIN — FUROSEMIDE SCH MG: 40 TABLET ORAL at 16:33

## 2023-08-21 RX ADMIN — ASPIRIN SCH MG: 81 TABLET, COATED ORAL at 10:03

## 2023-08-21 RX ADMIN — MAGNESIUM OXIDE TAB 400 MG (241.3 MG ELEMENTAL MG) SCH MG: 400 (241.3 MG) TAB at 10:03

## 2023-08-21 RX ADMIN — BUSPIRONE HYDROCHLORIDE SCH MG: 5 TABLET ORAL at 10:03

## 2023-08-21 RX ADMIN — ENOXAPARIN SODIUM SCH MG: 100 INJECTION SUBCUTANEOUS at 10:11

## 2023-08-21 RX ADMIN — NAPROXEN PRN MG: 500 TABLET ORAL at 16:37

## 2023-08-21 RX ADMIN — NITROGLYCERIN SCH MG: 2.5 CAPSULE ORAL at 10:03

## 2023-08-21 RX ADMIN — POLYVINYL ALCOHOL SCH ML: 14 SOLUTION/ DROPS OPHTHALMIC at 21:19

## 2023-08-21 RX ADMIN — GUAIFENESIN SCH MG: 600 TABLET, EXTENDED RELEASE ORAL at 21:19

## 2023-08-21 RX ADMIN — IPRATROPIUM BROMIDE AND ALBUTEROL SULFATE SCH ML: .5; 3 SOLUTION RESPIRATORY (INHALATION) at 10:42

## 2023-08-21 RX ADMIN — IPRATROPIUM BROMIDE AND ALBUTEROL SULFATE SCH ML: .5; 3 SOLUTION RESPIRATORY (INHALATION) at 02:20

## 2023-08-21 RX ADMIN — WATER SCH MG: 1 INJECTION INTRAMUSCULAR; INTRAVENOUS; SUBCUTANEOUS at 21:18

## 2023-08-21 RX ADMIN — IPRATROPIUM BROMIDE AND ALBUTEROL SULFATE SCH ML: .5; 3 SOLUTION RESPIRATORY (INHALATION) at 07:07

## 2023-08-21 RX ADMIN — CEFTRIAXONE SCH MLS/HR: 1 INJECTION, SOLUTION INTRAVENOUS at 21:18

## 2023-08-21 RX ADMIN — IPRATROPIUM BROMIDE AND ALBUTEROL SULFATE SCH ML: .5; 3 SOLUTION RESPIRATORY (INHALATION) at 15:04

## 2023-08-21 RX ADMIN — POLYVINYL ALCOHOL SCH ML: 14 SOLUTION/ DROPS OPHTHALMIC at 13:12

## 2023-08-21 RX ADMIN — IPRATROPIUM BROMIDE AND ALBUTEROL SULFATE SCH ML: .5; 3 SOLUTION RESPIRATORY (INHALATION) at 18:27

## 2023-08-21 RX ADMIN — NAPROXEN PRN MG: 500 TABLET ORAL at 22:10

## 2023-08-21 RX ADMIN — WATER SCH MG: 1 INJECTION INTRAMUSCULAR; INTRAVENOUS; SUBCUTANEOUS at 13:12

## 2023-08-21 RX ADMIN — POLYVINYL ALCOHOL SCH ML: 14 SOLUTION/ DROPS OPHTHALMIC at 16:33

## 2023-08-21 RX ADMIN — SIMVASTATIN SCH MG: 20 TABLET, FILM COATED ORAL at 21:19

## 2023-08-21 RX ADMIN — BUSPIRONE HYDROCHLORIDE SCH: 5 TABLET ORAL at 21:19

## 2023-08-21 RX ADMIN — WATER SCH MG: 1 INJECTION INTRAMUSCULAR; INTRAVENOUS; SUBCUTANEOUS at 06:33

## 2023-08-21 RX ADMIN — POTASSIUM CHLORIDE SCH MEQ: 10 TABLET, EXTENDED RELEASE ORAL at 21:19

## 2023-08-21 RX ADMIN — CARVEDILOL SCH MG: 12.5 TABLET, FILM COATED ORAL at 21:19

## 2023-08-21 RX ADMIN — POTASSIUM CHLORIDE SCH MEQ: 10 TABLET, EXTENDED RELEASE ORAL at 10:03

## 2023-08-21 RX ADMIN — IPRATROPIUM BROMIDE AND ALBUTEROL SULFATE SCH ML: .5; 3 SOLUTION RESPIRATORY (INHALATION) at 23:40

## 2023-08-21 RX ADMIN — TIOTROPIUM BROMIDE SCH EA: 18 CAPSULE ORAL; RESPIRATORY (INHALATION) at 07:09

## 2023-08-21 RX ADMIN — ALPRAZOLAM PRN MG: 1 TABLET ORAL at 22:09

## 2023-08-21 RX ADMIN — FUROSEMIDE SCH MG: 40 TABLET ORAL at 10:03

## 2023-08-21 RX ADMIN — CARVEDILOL SCH MG: 12.5 TABLET, FILM COATED ORAL at 10:05

## 2023-08-21 RX ADMIN — AZITHROMYCIN DIHYDRATE SCH MLS/HR: 500 INJECTION, POWDER, LYOPHILIZED, FOR SOLUTION INTRAVENOUS at 10:03

## 2023-08-21 NOTE — PCM.NOTE
Date and Time: 08/21/23  0501





Subjective Assessment: 


Ms. Brar is a 73 year old female with a pmhx of COPD (4L oxygen at baseline), 

HTN, and CHF who presented with SOB, wheezing, and a NP cough, admitted for COPD

exacerbation and pneumonia being treated with high dose steroids (Solu-Medrol 

125mg/IV), DuoNebs prn, Spiriva, zithromax, and rocephin. CT imaging of the 

chest showing severe emphysema and degenerative changes of the thoracic spine. 

Patient continues with persistent dyspnea. Lung sounds are coarse with exp 

wheezing on auscultation. She endorses that she feels worse today; she has been 

unable to clear sputum. She is at her baseline oxygen of 4L. Will add a one time

dose of IV lasix 40mg to daily regimen. Continue solumedrol/inh/duonebs. Her 

right eye is normal on exam today although she states it does continue to close 

intermittently. Discussed goals of care, patient interested in pulmonary rehab 

at discharge if it is close to home. Not interested in SNF or hospice at this 

time. 








- Review of Systems


Constitutional: No Symptoms


Eyes: Other (Right eye intermittenly closing)


Ears, Nose, & Throat: No Symptoms


Respiratory: Cough, Orthopnea, Short Of Breath, Wheezing


Cardiac: No Symptoms


Abdominal/Gastrointestinal: No Symptoms


Genitourinary Symptoms: Other (F/C)


Musculoskeletal: Back Pain (chronic)


Skin: No Symptoms


Neurological: No Symptoms


Psychological: No Symptoms


Hematologic/Lymphatic: No Symptoms


Immunological/Allergic: No Symptoms





Objective Exam


General Appearance: no apparent distress


Neurologic Exam: alert, oriented x 3, cooperative


Skin Exam: pale


Eye Exam: PERRL


Ears, Nose, Throat Exam: dry mucous membranes


Respiratory Exam: crackles/rales (Coarse), wheezing


Cardiovascular Exam: regular rate/rhythm, normal heart sounds


Gastrointestinal/Abdomen Exam: soft, normal bowel sounds


Extremity Exam: normal inspection





OBJECTIVE DATA


Vital Signs: 


                               Vital Signs - 24 hr











  Temp Pulse Resp BP Pulse Ox


 


 08/21/23 03:46  97.5 F  69  16  112/65  98


 


 08/21/23 02:20   81  20   100


 


 08/21/23 00:00   72  15  


 


 08/20/23 22:20   86  22   97


 


 08/20/23 19:45  97.5 F  81  20  131/68  95


 


 08/20/23 19:15   84  24   98


 


 08/20/23 16:00  97.7 F  86  16  122/74  97


 


 08/20/23 15:03   79  22   96


 


 08/20/23 12:00  97.9 F  80  16  114/58  99


 


 08/20/23 11:29   85  22   95


 


 08/20/23 07:22   95 H  22   95


 


 08/20/23 06:40  97.6 F  95 H  16  112/72  95








                        Pain Assessment - Last Documented











Pain Intensity                 5











Intake and Output: 


                                 Intake & Output











 08/18/23 08/19/23 08/20/23 08/21/23





 11:59 11:59 11:59 11:59


 


Intake Total   860 1160


 


Output Total   1900 2650


 


Balance   -1040 -1490


 


Weight   54.9 kg 











Lab Results: 


                            Lab Results-Last 24 Hours











  08/20/23 08/20/23 Range/Units





  13:05 13:05 


 


WBC  18.8 H   (4.0-10.5)  x10^3/uL


 


RBC  3.81 L   (4.1-5.4)  x10^6/uL


 


Hgb  11.1 L   (12.0-16.0)  g/dL


 


Hct  35.0   (35-47)  %


 


MCV  91.9   ()  fL


 


MCH  29.1   (26-32)  pg


 


MCHC  31.7 L   (32-36)  g/dL


 


RDW  14.4 H   (11.5-14.0)  %


 


Plt Count  287   (150-450)  x10^3/uL


 


MPV  10.4   (7.5-11.0)  fL


 


Sodium   137  (137-145)  mmol/L


 


Potassium   3.9  (3.5-5.1)  mmol/L


 


Chloride   94 L  ()  mmol/L


 


Carbon Dioxide   39 H  (22-30)  mmol/L


 


Anion Gap   8.2  (5-15)  MEQ/L


 


BUN   25 H  (7-17)  mg/dL


 


Creatinine   0.83  (0.52-1.04)  mg/dL


 


Estimated GFR   > 60.0  ML/MIN


 


Glucose   138 H  ()  mg/dL


 


Calcium   8.5  (8.4-10.2)  mg/dL


 


Total Bilirubin   1.10  (0.2-1.3)  mg/dL


 


AST   56 H  (14-36)  U/L


 


ALT   33  (0-35)  U/L


 


Alkaline Phosphatase   84  ()  U/L


 


Serum Total Protein   6.5  (6.3-8.2)  g/dL


 


Albumin   3.9  (3.5-5.0)  g/dL











Radiology Exams: 


                              Radiology Procedures











 Category Date Time Status


 


 CHEST 1 VIEW (PORTABLE) Stat Exams  08/19/23 16:05 Completed


 


 CHEST WITHOUT CONTRAST [CT] Stat Exams  08/19/23 17:30 Completed














Assessment/Plan


(1) Acute exacerbation of chronic obstructive airways disease


Current Visit: Yes   Status: Acute   


Assessment & Plan: 


74 y/o F with severe emphysema, on 4L at home, p/w COPD exacerbation with some 

contribution of pneumonia. CT Chest shows severe emphysematous changes, and 

patient with persistent severe dyspnea, impaired air movement, difficulty 

speaking, and tripod positioning while breathing. She declined use of BiPAP.





- Continue methylprednisone 125mg Q8, consider taper tomorrow if improved


- Duonebs 


- Spirivia


- Currently at BL 4LNC


- add Mucinex 


Code(s): J44.1 - CHRONIC OBSTRUCTIVE PULMONARY DISEASE W (ACUTE) EXACERBATION   





(2) Pneumonia


Current Visit: Yes   Status: Acute   


Assessment & Plan: 


Rocephin, Zithromax


- Chest XR: 8/19


Nonacute chest with chronic features


- CT chest: 8/19 reviewed with findings of:


1. Emphysematous lungs with mild centriacinar emphysema in both lower lobes.


There is hyperinflation of the left lung with trans mediastinal herniation.


2. Small focal consolidation is seen in the left lower lobe SE 3 image #31.


3. No soliatry pulmonary nodule or definite lymphadenopathy to suggest


metastases.


4. Marked degenerative changes of the thoracic spine with kyphotic deformity


and compression fracture


-Repeat CXR tomorrow





Code(s): J18.9 - PNEUMONIA, UNSPECIFIED ORGANISM   





(3) Back pain, acute


Current Visit: No   Status: Acute   


Qualifiers: 


   Back pain location: low back pain 


Assessment & Plan: 


- Degenerative changes as seen per CT


- Naprosyn 


Code(s): M54.9 - DORSALGIA, UNSPECIFIED   





(4) CHF (congestive heart failure)


Current Visit: No   Status: Acute   


Qualifiers: 


   Heart failure type: systolic   Heart failure chronicity: acute on chronic   

Qualified Code(s): I50.23 - Acute on chronic systolic (congestive) heart failure

   


Assessment & Plan: 


- Chronic, no recent echo


-Strict I&O


-Monitor renal/lytes with goal K>4, MG >2


-BNP at 1080


- Continue Lasix 40mg BID, add one time dose of IV lasix 40mg


-Repeat BNP tomorrow


Code(s): I50.9 - HEART FAILURE, UNSPECIFIED

## 2023-08-22 LAB
BACTERIA UR CULT: (no result)
PROT UR STRIP-MCNC: 100 MG/DL
RBC # URNS HPF: >100 /HPF (ref 0–5)
WBC URNS QL MICRO: (no result) /HPF (ref 0–5)

## 2023-08-22 RX ADMIN — IPRATROPIUM BROMIDE AND ALBUTEROL SULFATE SCH ML: .5; 3 SOLUTION RESPIRATORY (INHALATION) at 10:58

## 2023-08-22 RX ADMIN — CARVEDILOL SCH MG: 12.5 TABLET, FILM COATED ORAL at 09:40

## 2023-08-22 RX ADMIN — BUSPIRONE HYDROCHLORIDE SCH MG: 5 TABLET ORAL at 09:40

## 2023-08-22 RX ADMIN — DIPHENHYDRAMINE HYDROCHLORIDE AND HYDROCORTISONE AND NYSTATIN AND TETRACYCLINE HYDROCHLORIDE SCH ML: KIT ORAL at 17:12

## 2023-08-22 RX ADMIN — MAGNESIUM OXIDE TAB 400 MG (241.3 MG ELEMENTAL MG) SCH MG: 400 (241.3 MG) TAB at 09:40

## 2023-08-22 RX ADMIN — IPRATROPIUM BROMIDE AND ALBUTEROL SULFATE SCH ML: .5; 3 SOLUTION RESPIRATORY (INHALATION) at 06:54

## 2023-08-22 RX ADMIN — FUROSEMIDE SCH MG: 40 TABLET ORAL at 17:47

## 2023-08-22 RX ADMIN — WATER SCH MG: 1 INJECTION INTRAMUSCULAR; INTRAVENOUS; SUBCUTANEOUS at 05:52

## 2023-08-22 RX ADMIN — GUAIFENESIN SCH MG: 600 TABLET, EXTENDED RELEASE ORAL at 21:16

## 2023-08-22 RX ADMIN — GUAIFENESIN SCH MG: 600 TABLET, EXTENDED RELEASE ORAL at 09:40

## 2023-08-22 RX ADMIN — DIPHENHYDRAMINE HYDROCHLORIDE AND HYDROCORTISONE AND NYSTATIN AND TETRACYCLINE HYDROCHLORIDE SCH ML: KIT ORAL at 23:09

## 2023-08-22 RX ADMIN — CARVEDILOL SCH MG: 12.5 TABLET, FILM COATED ORAL at 21:16

## 2023-08-22 RX ADMIN — FUROSEMIDE SCH MG: 40 TABLET ORAL at 09:40

## 2023-08-22 RX ADMIN — IPRATROPIUM BROMIDE AND ALBUTEROL SULFATE SCH ML: .5; 3 SOLUTION RESPIRATORY (INHALATION) at 15:12

## 2023-08-22 RX ADMIN — POLYVINYL ALCOHOL SCH ML: 14 SOLUTION/ DROPS OPHTHALMIC at 21:17

## 2023-08-22 RX ADMIN — POLYVINYL ALCOHOL SCH ML: 14 SOLUTION/ DROPS OPHTHALMIC at 17:47

## 2023-08-22 RX ADMIN — ASPIRIN SCH MG: 81 TABLET, COATED ORAL at 09:40

## 2023-08-22 RX ADMIN — NITROGLYCERIN SCH MG: 2.5 CAPSULE ORAL at 09:40

## 2023-08-22 RX ADMIN — BUSPIRONE HYDROCHLORIDE SCH MG: 5 TABLET ORAL at 21:16

## 2023-08-22 RX ADMIN — NAPROXEN PRN MG: 500 TABLET ORAL at 10:23

## 2023-08-22 RX ADMIN — POLYVINYL ALCOHOL SCH ML: 14 SOLUTION/ DROPS OPHTHALMIC at 14:25

## 2023-08-22 RX ADMIN — IPRATROPIUM BROMIDE AND ALBUTEROL SULFATE SCH ML: .5; 3 SOLUTION RESPIRATORY (INHALATION) at 03:30

## 2023-08-22 RX ADMIN — TIOTROPIUM BROMIDE SCH EA: 18 CAPSULE ORAL; RESPIRATORY (INHALATION) at 06:58

## 2023-08-22 RX ADMIN — IPRATROPIUM BROMIDE AND ALBUTEROL SULFATE SCH ML: .5; 3 SOLUTION RESPIRATORY (INHALATION) at 22:40

## 2023-08-22 RX ADMIN — ALPRAZOLAM PRN MG: 1 TABLET ORAL at 21:23

## 2023-08-22 RX ADMIN — IPRATROPIUM BROMIDE AND ALBUTEROL SULFATE SCH ML: .5; 3 SOLUTION RESPIRATORY (INHALATION) at 19:00

## 2023-08-22 RX ADMIN — POLYVINYL ALCOHOL SCH ML: 14 SOLUTION/ DROPS OPHTHALMIC at 09:41

## 2023-08-22 RX ADMIN — POTASSIUM CHLORIDE SCH MEQ: 10 TABLET, EXTENDED RELEASE ORAL at 09:40

## 2023-08-22 RX ADMIN — POTASSIUM CHLORIDE SCH MEQ: 10 TABLET, EXTENDED RELEASE ORAL at 21:16

## 2023-08-22 RX ADMIN — Medication SCH TAB: at 11:32

## 2023-08-22 RX ADMIN — WATER SCH MG: 1 INJECTION INTRAMUSCULAR; INTRAVENOUS; SUBCUTANEOUS at 21:16

## 2023-08-22 RX ADMIN — AZITHROMYCIN DIHYDRATE SCH MLS/HR: 500 INJECTION, POWDER, LYOPHILIZED, FOR SOLUTION INTRAVENOUS at 09:39

## 2023-08-22 RX ADMIN — WATER SCH MG: 1 INJECTION INTRAMUSCULAR; INTRAVENOUS; SUBCUTANEOUS at 14:25

## 2023-08-22 RX ADMIN — SIMVASTATIN SCH MG: 20 TABLET, FILM COATED ORAL at 21:16

## 2023-08-22 RX ADMIN — ENOXAPARIN SODIUM SCH MG: 100 INJECTION SUBCUTANEOUS at 09:41

## 2023-08-22 NOTE — PCM.NOTE
Date and Time: 08/22/23 0929





Subjective Assessment: 





Ms. Brar is a 73 year old female with a pmhx of COPD (4L oxygen at baseline), 

HTN, and CHF who presented with SOB, wheezing, and a NP cough, admitted for COPD

exacerbation and pneumonia being treated with high dose steroids (Solu-Medrol 

125mg/IV), DuoNebs prn, Spiriva, zithromax, and rocephin. CT imaging of the 

chest showing severe emphysema and degenerative changes of the thoracic spine. 

Patient continues with persistent dyspnea. Appears less short of breath when 

talking. Lung sounds improved but remain coarse at bilateral lung bases with exp

wheezing on auscultation. Patient endorses that she is feeling better today. 

Spoke with Dr. Carroll (pulmonology) who follows her,  and updated him on h

ospital course. Patient denies CP,N/V/D, 





- Review of Systems


Eyes: Other (right eye with intermittent blurring/closing)


Ears, Nose, & Throat: No Symptoms


Respiratory: Cough, Short Of Breath, Wheezing, Other (on baseline 4L NC)


Abdominal/Gastrointestinal: No Symptoms


Genitourinary Symptoms: No Symptoms, Other (FC)


Musculoskeletal: Back Pain


Skin: No Symptoms


Neurological: No Symptoms


Psychological: No Symptoms





Objective Exam


General Appearance: no apparent distress


Neurologic Exam: alert, oriented x 3, cooperative


Skin Exam: pale


Eye Exam: PERRL, eyes nml inspection


Respiratory Exam: crackles/rales (bilateral base Coarse crackles with exp 

wheezing), wheezing


Cardiovascular Exam: regular rate/rhythm, normal heart sounds


Gastrointestinal/Abdomen Exam: soft, normal bowel sounds


Extremity Exam: normal inspection


Back Exam: normal inspection





OBJECTIVE DATA


Vital Signs: 


                               Vital Signs - 24 hr











  Temp Pulse Resp BP Pulse Ox


 


 08/22/23 07:03   91 H  20   96


 


 08/22/23 06:00  97.6 F  80  20  126/78  97


 


 08/22/23 03:30   83  20   97


 


 08/22/23 00:00  97.5 F  85  24  135/80  95


 


 08/21/23 23:40   86  22   95


 


 08/21/23 19:41  97.1 F  83  32 H  155/80  98


 


 08/21/23 18:27   96 H  24   98


 


 08/21/23 16:00  97.8 F  84  20  152/86  100


 


 08/21/23 11:30  97.6 F  82  22  106/72  98


 


 08/21/23 10:43   88  22   95








                        Pain Assessment - Last Documented











Pain Intensity                 0











Intake and Output: 


                                 Intake & Output











 08/19/23 08/20/23 08/21/23 08/22/23





 11:59 11:59 11:59 11:59


 


Intake Total  860 1520 840


 


Output Total  1900 2650 1750


 


Balance  -1040 -1130 -910


 


Weight  54.9 kg  











Lab Results: 


                            Lab Results-Last 24 Hours











  08/22/23 Range/Units





  02:10 


 


NT-Pro-B Natriuret Pep  1920  (<300)  pg/mL











Radiology Exams: 


                              Radiology Procedures











 Category Date Time Status


 


 CHEST 2 VIEWS (PA AND LAT) Routine Exams  08/22/23 08:00 Taken














Assessment/Plan


(1) Acute exacerbation of chronic obstructive airways disease


Current Visit: Yes   Status: Acute   


Assessment & Plan: 


72 y/o F with severe emphysema, on 4L at home, p/w COPD exacerbation with some 

contribution of pneumonia. CT Chest shows severe emphysematous changes, and 

patient with persistent severe dyspnea, impaired air movement, difficulty 

speaking, and tripod positioning while breathing. She declined use of BiPAP.





- Continue methylprednisone 125mg Q8, consider taper tomorrow if improved


- Duonebs 


- Spirivia


- Currently at BL 4LNC


- add Mucinex 


-Consult Dr. Carroll


Code(s): J44.1 - CHRONIC OBSTRUCTIVE PULMONARY DISEASE W (ACUTE) EXACERBATION   





(2) Pneumonia


Current Visit: Yes   Status: Acute   


Assessment & Plan: 


Rocephin, Zithromax


- Chest XR: 8/19


Nonacute chest with chronic features


- CT chest: 8/19 reviewed with findings of:


1. Emphysematous lungs with mild centriacinar emphysema in both lower lobes.


There is hyperinflation of the left lung with trans mediastinal herniation.


2. Small focal consolidation is seen in the left lower lobe SE 3 image #31.


3. No soliatry pulmonary nodule or definite lymphadenopathy to suggest


metastases.


4. Marked degenerative changes of the thoracic spine with kyphotic deformity


and compression fracture


-Repeat CXR tomorrow


8/22:


-CXR less inflated and grossly unchanged again demonstrating COPD,


mild bibasilar subsegmental atelectasis/scarring, and right lung suture


material/surgical clips.  Heart not enlarged again with tortuous descending


aorta.  No new/acute abnormalities.


-Will d/c abx


Code(s): J18.9 - PNEUMONIA, UNSPECIFIED ORGANISM   





(3) Back pain, acute


Current Visit: No   Status: Acute   


Qualifiers: 


   Back pain location: low back pain 


Assessment & Plan: 


- Degenerative changes as seen per CT


- Naprosyn 


Code(s): M54.9 - DORSALGIA, UNSPECIFIED   





(4) CHF (congestive heart failure)


Current Visit: No   Status: Acute   


Qualifiers: 


   Heart failure type: systolic   Heart failure chronicity: acute on chronic   

Qualified Code(s): I50.23 - Acute on chronic systolic (congestive) heart failure

  


Assessment & Plan: 


- Chronic, no recent echo


-Strict I&O


-Monitor renal/lytes with goal K>4, MG >2


-BNP at 1080


- Continue Lasix 80mg BID, add one time dose of IV lasix 40mg


-Repeat BNP tomorrow


8/22:


-BNP at 1920, may be secondary to COPD exacerbation, does not appear to be in 

exacerbation, no noted edema on exam


Code(s): I50.9 - HEART FAILURE, UNSPECIFIED

## 2023-08-22 NOTE — XRAY
Indication: Short of breath.



Comparison: August 19, 2023



PA/lateral chest less inflated and grossly unchanged again demonstrating COPD,

mild bibasilar subsegmental atelectasis/scarring, and right lung suture

material/surgical clips.  Heart not enlarged again with tortuous descending

aorta.  No new/acute abnormalities.

## 2023-08-23 LAB
ALBUMIN SERPL-MCNC: 3.1 G/DL (ref 3.5–5)
ALP SERPL-CCNC: 60 U/L (ref 38–126)
ALT SERPL-CCNC: 25 U/L (ref 0–35)
ANION GAP SERPL CALC-SCNC: 9.7 MEQ/L (ref 5–15)
AST SERPL QL: 31 U/L (ref 14–36)
BACTERIA UR CULT: YES
BASOPHILS # BLD AUTO: 0.02 X10^3/UL (ref 0–0.4)
BASOPHILS NFR BLD AUTO: 0.1 % (ref 0–0.4)
BILIRUB BLD-MCNC: 0.6 MG/DL (ref 0.2–1.3)
BLD SMEAR INTERP: YES
BUN SERPL-MCNC: 44 MG/DL (ref 7–17)
CALCIUM SPEC-MCNC: 7 MG/DL (ref 8.4–10.2)
CHLORIDE SERPL-SCNC: 94 MMOL/L (ref 98–107)
CO2 SERPL-SCNC: 40 MMOL/L (ref 22–30)
CREAT SERPL-MCNC: 0.72 MG/DL (ref 0.52–1.04)
EOSINOPHIL # BLD AUTO: 0 X10^3/UL (ref 0–0.5)
GFR SERPLBLD BASED ON 1.73 SQ M-ARVRAT: > 60 ML/MIN
GLUCOSE SERPL-MCNC: 151 MG/DL (ref 74–106)
HCT VFR BLD AUTO: 31.6 % (ref 35–47)
HGB BLD-MCNC: 9.8 G/DL (ref 12–16)
IMM GRANULOCYTES # BLD: 0.13 X10^3U/L (ref 0–0.03)
IMM GRANULOCYTES NFR BLD: 0.8 % (ref 0–0.4)
LYMPHOCYTES # SPEC AUTO: 0.54 X10^3/UL (ref 1–4.6)
MCH RBC QN AUTO: 29 PG (ref 26–32)
MCHC RBC AUTO-ENTMCNC: 31 G/DL (ref 32–36)
MONOCYTES # BLD AUTO: 1.08 X10^3/UL (ref 0–1.3)
NRBC # BLD AUTO: 0 X10^3U/L (ref 0–0.01)
NRBC BLD AUTO-RTO: 0 % (ref 0–0.1)
PLATELET # BLD AUTO: 234 X10^3/UL (ref 150–450)
POTASSIUM SERPLBLD-SCNC: 3.7 MMOL/L (ref 3.5–5.1)
PROT SERPL-MCNC: 5.3 G/DL (ref 6.3–8.2)
PROT UR STRIP-MCNC: 30 MG/DL
RBC # BLD AUTO: 3.38 X10^6/UL (ref 4.1–5.4)
RBC # URNS HPF: >100 /HPF (ref 0–5)
SODIUM SERPL-SCNC: 140 MMOL/L (ref 137–145)
WBC # BLD AUTO: 16.4 X10^3/UL (ref 4–10.5)
WBC URNS QL MICRO: (no result) /HPF (ref 0–5)

## 2023-08-23 RX ADMIN — FUROSEMIDE SCH MG: 10 INJECTION, SOLUTION INTRAMUSCULAR; INTRAVENOUS at 14:44

## 2023-08-23 RX ADMIN — CEFTRIAXONE SCH MLS/HR: 1 INJECTION, SOLUTION INTRAVENOUS at 10:24

## 2023-08-23 RX ADMIN — POTASSIUM CHLORIDE SCH MEQ: 10 TABLET, EXTENDED RELEASE ORAL at 10:24

## 2023-08-23 RX ADMIN — IPRATROPIUM BROMIDE AND ALBUTEROL SULFATE SCH ML: .5; 3 SOLUTION RESPIRATORY (INHALATION) at 07:22

## 2023-08-23 RX ADMIN — FUROSEMIDE SCH MG: 40 TABLET ORAL at 10:24

## 2023-08-23 RX ADMIN — IPRATROPIUM BROMIDE AND ALBUTEROL SULFATE SCH ML: .5; 3 SOLUTION RESPIRATORY (INHALATION) at 15:11

## 2023-08-23 RX ADMIN — FUROSEMIDE SCH MG: 10 INJECTION, SOLUTION INTRAMUSCULAR; INTRAVENOUS at 21:43

## 2023-08-23 RX ADMIN — POLYVINYL ALCOHOL SCH ML: 14 SOLUTION/ DROPS OPHTHALMIC at 18:34

## 2023-08-23 RX ADMIN — CARVEDILOL SCH MG: 12.5 TABLET, FILM COATED ORAL at 21:43

## 2023-08-23 RX ADMIN — IPRATROPIUM BROMIDE AND ALBUTEROL SULFATE SCH ML: .5; 3 SOLUTION RESPIRATORY (INHALATION) at 11:37

## 2023-08-23 RX ADMIN — Medication SCH TAB: at 10:24

## 2023-08-23 RX ADMIN — ASPIRIN SCH MG: 81 TABLET, COATED ORAL at 10:24

## 2023-08-23 RX ADMIN — DIPHENHYDRAMINE HYDROCHLORIDE AND HYDROCORTISONE AND NYSTATIN AND TETRACYCLINE HYDROCHLORIDE SCH ML: KIT ORAL at 18:52

## 2023-08-23 RX ADMIN — POLYVINYL ALCOHOL SCH ML: 14 SOLUTION/ DROPS OPHTHALMIC at 10:27

## 2023-08-23 RX ADMIN — NITROGLYCERIN SCH MG: 2.5 CAPSULE ORAL at 10:24

## 2023-08-23 RX ADMIN — GUAIFENESIN SCH MG: 600 TABLET, EXTENDED RELEASE ORAL at 10:24

## 2023-08-23 RX ADMIN — WATER SCH MG: 1 INJECTION INTRAMUSCULAR; INTRAVENOUS; SUBCUTANEOUS at 05:05

## 2023-08-23 RX ADMIN — SIMVASTATIN SCH MG: 20 TABLET, FILM COATED ORAL at 21:43

## 2023-08-23 RX ADMIN — DIPHENHYDRAMINE HYDROCHLORIDE AND HYDROCORTISONE AND NYSTATIN AND TETRACYCLINE HYDROCHLORIDE SCH ML: KIT ORAL at 12:25

## 2023-08-23 RX ADMIN — TIOTROPIUM BROMIDE SCH EA: 18 CAPSULE ORAL; RESPIRATORY (INHALATION) at 07:24

## 2023-08-23 RX ADMIN — IPRATROPIUM BROMIDE AND ALBUTEROL SULFATE SCH ML: .5; 3 SOLUTION RESPIRATORY (INHALATION) at 02:25

## 2023-08-23 RX ADMIN — WATER SCH MG: 1 INJECTION INTRAMUSCULAR; INTRAVENOUS; SUBCUTANEOUS at 14:44

## 2023-08-23 RX ADMIN — NAPROXEN PRN MG: 500 TABLET ORAL at 09:22

## 2023-08-23 RX ADMIN — GUAIFENESIN SCH MG: 600 TABLET, EXTENDED RELEASE ORAL at 21:43

## 2023-08-23 RX ADMIN — POTASSIUM CHLORIDE SCH MEQ: 10 TABLET, EXTENDED RELEASE ORAL at 21:43

## 2023-08-23 RX ADMIN — POLYVINYL ALCOHOL SCH ML: 14 SOLUTION/ DROPS OPHTHALMIC at 12:25

## 2023-08-23 RX ADMIN — IPRATROPIUM BROMIDE AND ALBUTEROL SULFATE SCH ML: .5; 3 SOLUTION RESPIRATORY (INHALATION) at 23:05

## 2023-08-23 RX ADMIN — ALPRAZOLAM PRN MG: 1 TABLET ORAL at 21:43

## 2023-08-23 RX ADMIN — CARVEDILOL SCH MG: 12.5 TABLET, FILM COATED ORAL at 10:24

## 2023-08-23 RX ADMIN — BUSPIRONE HYDROCHLORIDE SCH MG: 5 TABLET ORAL at 10:24

## 2023-08-23 RX ADMIN — IPRATROPIUM BROMIDE AND ALBUTEROL SULFATE SCH ML: .5; 3 SOLUTION RESPIRATORY (INHALATION) at 18:36

## 2023-08-23 RX ADMIN — MAGNESIUM OXIDE TAB 400 MG (241.3 MG ELEMENTAL MG) SCH MG: 400 (241.3 MG) TAB at 10:24

## 2023-08-23 RX ADMIN — DIPHENHYDRAMINE HYDROCHLORIDE AND HYDROCORTISONE AND NYSTATIN AND TETRACYCLINE HYDROCHLORIDE SCH ML: KIT ORAL at 05:05

## 2023-08-23 NOTE — PCM.NOTE
Date and Time: 08/23/23 0513





Subjective Assessment: 


Ms. Brar is a 73 year old female with a pmhx of COPD (4L oxygen at baseline), 

HTN, and CHF who presented with SOB, wheezing, and a NP cough, admitted for COPD

exacerbation and pneumonia being treated with high dose steroids (Solu-Medrol 

125mg/IV), DuoNebs prn, Spiriva, zithromax, and rocephin. CT imaging of the 

chest showing severe emphysema and degenerative changes of the thoracic spine. 

Patient continues with persistent dyspnea. Patient endorses continued shortness 

of breath and wheezing. Additionally the patient was noted with blood in her 

[place] bag yesterday. U/A suspicious for infection. Order placed for F/C to be 

removed. Patient started on Rocephin.








- Review of Systems


Constitutional: No Symptoms


Eyes: No Symptoms


Ears, Nose, & Throat: No Symptoms


Respiratory: Cough, Short Of Breath, Wheezing


Cardiac: No Symptoms


Abdominal/Gastrointestinal: No Symptoms


Genitourinary Symptoms: Dysuria, Hematuria


Musculoskeletal: Back Pain


Skin: No Symptoms


Neurological: No Symptoms





Objective Exam


General Appearance: no apparent distress


Neurologic Exam: alert, oriented x 3, cooperative


Skin Exam: pale


Eye Exam: PERRL


Respiratory Exam: crackles/rales, wheezing


Cardiovascular Exam: regular rate/rhythm, normal heart sounds


Gastrointestinal/Abdomen Exam: soft, normal bowel sounds


Extremity Exam: normal inspection





OBJECTIVE DATA


Vital Signs: 


                               Vital Signs - 24 hr











  Temp Pulse Resp BP Pulse Ox


 


 08/23/23 04:00  98.0 F  89  18  138/62  95


 


 08/23/23 02:25   91 H  20   98


 


 08/22/23 23:30  97.6 F  88  23  140/88  96


 


 08/22/23 22:40   96 H  26 H   97


 


 08/22/23 19:00   105 H  24   91 L


 


 08/22/23 18:00  97.8 F  92 H  25 H  143/86  96


 


 08/22/23 15:14   92 H  20   98


 


 08/22/23 14:00  97.2 F  96 H  16  147/89  96


 


 08/22/23 11:00   90  24   97


 


 08/22/23 10:00  97.6 F  95 H  20  115/77  96


 


 08/22/23 07:03   91 H  20   96


 


 08/22/23 06:00  97.6 F  80  20  126/78  97








                        Pain Assessment - Last Documented











Pain Intensity                 0











Intake and Output: 


                                 Intake & Output











 08/20/23 08/21/23 08/22/23 08/23/23





 11:59 11:59 11:59 11:59


 


Intake Total 860 5839 925 6347


 


Output Total 1900 2650 1750 2200


 


Balance -1040 -1130 -910 -800


 


Weight 54.9 kg   54.9 kg











Lab Results: 


                            Lab Results-Last 24 Hours











  08/22/23 Range/Units





  16:40 


 


Urine Color  Red A  (Yellow)  


 


Urine Appearance  Turbid A  (Clear)  


 


Urine pH  7.0  (4.6-8.0)  


 


Ur Specific Gravity  1.010  (1.005-1.030)  


 


Urine Protein  100 A  (Negative)  


 


Urine Glucose (UA)  Negative  (Negative)  mg/dL


 


Urine Ketones  Negative  (Negative)  


 


Urine Blood  Moderate A  (Negative)  


 


Urine Nitrite  Positive A  (Negative)  


 


Urine Bilirubin  Moderate A  (Negative)  


 


Urine Urobilinogen  0.2  (0.2)  mg/dL


 


Ur Leukocyte Esterase  Large A  (Negative)  


 


U Hyaline Cast (Auto)  3-5 A  (0-2)  /LPF


 


Urine Microscopic RBC  >100 A  (0-5)  /HPF


 


Urine Microscopic WBC   A  (0-5)  /HPF


 


Ur Epithelial Cells  None Seen  (None Seen)  /HPF


 


Urine Bacteria  None Seen  (None Seen)  /HPF


 


Urine Culture Reflexed  ORDERED SEPARATELY  (NO)  











Radiology Exams: 


                              Radiology Procedures











 Category Date Time Status


 


 CHEST 2 VIEWS (PA AND LAT) Routine Exams  08/22/23 08:00 Completed











Multi-Disciplinary Progress Notes: 


                        Multi-Disciplinary Progress Notes





08/22/23 10:02 Case Management Note by Roxy Campbell


S/W PATIENT- SHE CONTINUES TO DENY ANY NEW NEEDS AT TIME OF DC. I AGAIN 

ENCOURAGED [facility]- SHE STATED SHE WOULD THINK ABOUT IT. SHE PLANS TO DC HOME WITH 

HER SIG OTHER WHO ASSISTS HER AS NEEDED. PATIENT ALREADY HAS HOME OXYGEN WITH 

PORTABILITY AND IS CURRENTLY ON HER HOME SETTING





Initialized on 08/22/23 10:02 - END OF NOTE

















Assessment/Plan


(1) Acute exacerbation of chronic obstructive airways disease


Current Visit: Yes   Status: Acute   


Assessment & Plan: 


74 y/o F with severe emphysema, on 4L at home, p/w COPD exacerbation with some 

contribution of pneumonia. CT Chest shows severe emphysematous changes, and 

patient with persistent severe dyspnea, impaired air movement, difficulty 

speaking, and tripod positioning while breathing. She declined use of BiPAP.





- Continue methylprednisone 125mg Q8, consider taper tomorrow if improved


- Johanny 


- Nahid


- Currently at BL 4LNC


- add Mucinex 


-Consult Dr. Carroll


Code(s): J44.1 - CHRONIC OBSTRUCTIVE PULMONARY DISEASE W (ACUTE) EXACERBATION   





(2) Pneumonia


Current Visit: Yes   Status: Acute   


Assessment & Plan: 


Rocephin, Zithromax


- Chest XR: 8/19


Nonacute chest with chronic features


- CT chest: 8/19 reviewed with findings of:


1. Emphysematous lungs with mild centriacinar emphysema in both lower lobes.


There is hyperinflation of the left lung with trans mediastinal herniation.


2. Small focal consolidation is seen in the left lower lobe SE 3 image #31.


3. No soliatry pulmonary nodule or definite lymphadenopathy to suggest


metastases.


4. Marked degenerative changes of the thoracic spine with kyphotic deformity


and compression fracture


-Repeat CXR tomorrow


8/22:


-CXR less inflated and grossly unchanged again demonstrating COPD,


mild bibasilar subsegmental atelectasis/scarring, and right lung suture


material/surgical clips.  Heart not enlarged again with tortuous descending


aorta.  No new/acute abnormalities.


-D/c zithromax





Code(s): J18.9 - PNEUMONIA, UNSPECIFIED ORGANISM   





(3) UTI (urinary tract infection)


Current Visit: Yes   Status: Acute   


Assessment & Plan: 


-UA suspicious for UTI, will treat empirically with Rocephin pending culture


-D/C F/C, may replace with new F/C need new urine once cathed


Code(s): N39.0 - URINARY TRACT INFECTION, SITE NOT SPECIFIED   





(4) Back pain, acute


Current Visit: No   Status: Acute   


Qualifiers: 


   Back pain location: low back pain 


Assessment & Plan: 


- Degenerative changes as seen per CT


- Naprosyn 


Code(s): M54.9 - DORSALGIA, UNSPECIFIED   





(5) CHF (congestive heart failure)


Current Visit: No   Status: Acute   


Qualifiers: 


   Heart failure type: systolic   Heart failure chronicity: acute on chronic   

Qualified Code(s): I50.23 - Acute on chronic systolic (congestive) heart failure

  


Assessment & Plan: 


- Chronic, no recent echo


-Strict I&O


-Monitor renal/lytes with goal K>4, MG >2


-BNP at 1080


- Continue Lasix 80mg BID, add one time dose of IV lasix 40mg


-Repeat BNP tomorrow


8/22:


-BNP at 1920, may be secondary to COPD exacerbation, does not appear to be in 

exacerbation, no noted edema on exam


Code(s): I50.9 - HEART FAILURE, UNSPECIFIED

## 2023-08-24 LAB
ALBUMIN SERPL-MCNC: 3.7 G/DL (ref 3.5–5)
ALP SERPL-CCNC: 68 U/L (ref 38–126)
ALT SERPL-CCNC: 27 U/L (ref 0–35)
ANION GAP SERPL CALC-SCNC: 10.9 MEQ/L (ref 5–15)
AST SERPL QL: 32 U/L (ref 14–36)
BASOPHILS # BLD AUTO: 0.05 X10^3/UL (ref 0–0.4)
BASOPHILS NFR BLD AUTO: 0.2 % (ref 0–0.4)
BILIRUB BLD-MCNC: 0.7 MG/DL (ref 0.2–1.3)
BLD SMEAR INTERP: YES
BUN SERPL-MCNC: 38 MG/DL (ref 7–17)
CALCIUM SPEC-MCNC: 7.9 MG/DL (ref 8.4–10.2)
CHLORIDE SERPL-SCNC: 91 MMOL/L (ref 98–107)
CO2 SERPL-SCNC: 43 MMOL/L (ref 22–30)
CREAT SERPL-MCNC: 0.62 MG/DL (ref 0.52–1.04)
EOSINOPHIL # BLD AUTO: 0 X10^3/UL (ref 0–0.5)
GFR SERPLBLD BASED ON 1.73 SQ M-ARVRAT: > 60 ML/MIN
GLUCOSE SERPL-MCNC: 154 MG/DL (ref 74–106)
HCT VFR BLD AUTO: 34.9 % (ref 35–47)
HGB BLD-MCNC: 10.6 G/DL (ref 12–16)
IMM GRANULOCYTES # BLD: 0.26 X10^3U/L (ref 0–0.03)
IMM GRANULOCYTES NFR BLD: 1.2 % (ref 0–0.4)
LYMPHOCYTES # SPEC AUTO: 0.51 X10^3/UL (ref 1–4.6)
MCH RBC QN AUTO: 28.7 PG (ref 26–32)
MCHC RBC AUTO-ENTMCNC: 30.4 G/DL (ref 32–36)
MONOCYTES # BLD AUTO: 1.46 X10^3/UL (ref 0–1.3)
NRBC # BLD AUTO: 0 X10^3U/L (ref 0–0.01)
NRBC BLD AUTO-RTO: 0 % (ref 0–0.1)
PLATELET # BLD AUTO: 283 X10^3/UL (ref 150–450)
POTASSIUM SERPLBLD-SCNC: 3.9 MMOL/L (ref 3.5–5.1)
PROT SERPL-MCNC: 6 G/DL (ref 6.3–8.2)
RBC # BLD AUTO: 3.69 X10^6/UL (ref 4.1–5.4)
SODIUM SERPL-SCNC: 141 MMOL/L (ref 137–145)
WBC # BLD AUTO: 22.2 X10^3/UL (ref 4–10.5)

## 2023-08-24 RX ADMIN — POLYVINYL ALCOHOL SCH: 14 SOLUTION/ DROPS OPHTHALMIC at 00:03

## 2023-08-24 RX ADMIN — NAPROXEN PRN MG: 500 TABLET ORAL at 08:46

## 2023-08-24 RX ADMIN — LORAZEPAM PRN MG: 2 INJECTION, SOLUTION INTRAMUSCULAR; INTRAVENOUS at 22:38

## 2023-08-24 RX ADMIN — SODIUM CHLORIDE SCH: 9 INJECTION, SOLUTION INTRAVENOUS at 07:20

## 2023-08-24 RX ADMIN — CARVEDILOL SCH MG: 12.5 TABLET, FILM COATED ORAL at 22:41

## 2023-08-24 RX ADMIN — MAGNESIUM OXIDE TAB 400 MG (241.3 MG ELEMENTAL MG) SCH MG: 400 (241.3 MG) TAB at 08:46

## 2023-08-24 RX ADMIN — POLYVINYL ALCOHOL SCH ML: 14 SOLUTION/ DROPS OPHTHALMIC at 17:45

## 2023-08-24 RX ADMIN — Medication SCH TAB: at 08:46

## 2023-08-24 RX ADMIN — GUAIFENESIN SCH MG: 600 TABLET, EXTENDED RELEASE ORAL at 08:46

## 2023-08-24 RX ADMIN — BUSPIRONE HYDROCHLORIDE SCH MG: 5 TABLET ORAL at 22:37

## 2023-08-24 RX ADMIN — ALPRAZOLAM PRN MG: 0.25 TABLET ORAL at 11:23

## 2023-08-24 RX ADMIN — DIPHENHYDRAMINE HYDROCHLORIDE AND HYDROCORTISONE AND NYSTATIN AND TETRACYCLINE HYDROCHLORIDE SCH ML: KIT ORAL at 17:45

## 2023-08-24 RX ADMIN — IPRATROPIUM BROMIDE AND ALBUTEROL SULFATE SCH ML: .5; 3 SOLUTION RESPIRATORY (INHALATION) at 20:42

## 2023-08-24 RX ADMIN — DIPHENHYDRAMINE HYDROCHLORIDE AND HYDROCORTISONE AND NYSTATIN AND TETRACYCLINE HYDROCHLORIDE SCH: KIT ORAL at 00:04

## 2023-08-24 RX ADMIN — ASPIRIN SCH MG: 81 TABLET, COATED ORAL at 08:47

## 2023-08-24 RX ADMIN — DIPHENHYDRAMINE HYDROCHLORIDE AND HYDROCORTISONE AND NYSTATIN AND TETRACYCLINE HYDROCHLORIDE SCH ML: KIT ORAL at 11:24

## 2023-08-24 RX ADMIN — BUSPIRONE HYDROCHLORIDE SCH MG: 5 TABLET ORAL at 08:46

## 2023-08-24 RX ADMIN — GUAIFENESIN SCH MG: 600 TABLET, EXTENDED RELEASE ORAL at 22:41

## 2023-08-24 RX ADMIN — POTASSIUM CHLORIDE SCH MEQ: 10 TABLET, EXTENDED RELEASE ORAL at 22:38

## 2023-08-24 RX ADMIN — IPRATROPIUM BROMIDE AND ALBUTEROL SULFATE SCH ML: .5; 3 SOLUTION RESPIRATORY (INHALATION) at 23:21

## 2023-08-24 RX ADMIN — CARVEDILOL SCH MG: 12.5 TABLET, FILM COATED ORAL at 08:46

## 2023-08-24 RX ADMIN — POLYVINYL ALCOHOL SCH ML: 14 SOLUTION/ DROPS OPHTHALMIC at 22:40

## 2023-08-24 RX ADMIN — SIMVASTATIN SCH MG: 20 TABLET, FILM COATED ORAL at 22:38

## 2023-08-24 RX ADMIN — WATER SCH MG: 1 INJECTION INTRAMUSCULAR; INTRAVENOUS; SUBCUTANEOUS at 14:08

## 2023-08-24 RX ADMIN — POLYVINYL ALCOHOL SCH ML: 14 SOLUTION/ DROPS OPHTHALMIC at 11:24

## 2023-08-24 RX ADMIN — POTASSIUM CHLORIDE SCH MEQ: 10 TABLET, EXTENDED RELEASE ORAL at 08:46

## 2023-08-24 RX ADMIN — BUSPIRONE HYDROCHLORIDE SCH: 5 TABLET ORAL at 00:04

## 2023-08-24 RX ADMIN — DIPHENHYDRAMINE HYDROCHLORIDE AND HYDROCORTISONE AND NYSTATIN AND TETRACYCLINE HYDROCHLORIDE SCH ML: KIT ORAL at 05:13

## 2023-08-24 RX ADMIN — IPRATROPIUM BROMIDE AND ALBUTEROL SULFATE SCH ML: .5; 3 SOLUTION RESPIRATORY (INHALATION) at 03:44

## 2023-08-24 RX ADMIN — NITROGLYCERIN SCH MG: 2.5 CAPSULE ORAL at 08:46

## 2023-08-24 RX ADMIN — IPRATROPIUM BROMIDE AND ALBUTEROL SULFATE SCH ML: .5; 3 SOLUTION RESPIRATORY (INHALATION) at 10:54

## 2023-08-24 RX ADMIN — POLYVINYL ALCOHOL SCH ML: 14 SOLUTION/ DROPS OPHTHALMIC at 08:47

## 2023-08-24 RX ADMIN — IPRATROPIUM BROMIDE AND ALBUTEROL SULFATE SCH ML: .5; 3 SOLUTION RESPIRATORY (INHALATION) at 15:02

## 2023-08-24 RX ADMIN — HYDROCODONE BITARTRATE AND ACETAMINOPHEN PRN TAB: 5; 325 TABLET ORAL at 22:37

## 2023-08-24 RX ADMIN — CEFTRIAXONE SCH MLS/HR: 1 INJECTION, SOLUTION INTRAVENOUS at 08:47

## 2023-08-24 RX ADMIN — FUROSEMIDE SCH MG: 10 INJECTION, SOLUTION INTRAMUSCULAR; INTRAVENOUS at 22:40

## 2023-08-24 RX ADMIN — IPRATROPIUM BROMIDE AND ALBUTEROL SULFATE SCH ML: .5; 3 SOLUTION RESPIRATORY (INHALATION) at 07:24

## 2023-08-24 RX ADMIN — FUROSEMIDE SCH MG: 10 INJECTION, SOLUTION INTRAMUSCULAR; INTRAVENOUS at 14:07

## 2023-08-24 RX ADMIN — WATER SCH MG: 1 INJECTION INTRAMUSCULAR; INTRAVENOUS; SUBCUTANEOUS at 22:39

## 2023-08-24 RX ADMIN — TIOTROPIUM BROMIDE SCH EA: 18 CAPSULE ORAL; RESPIRATORY (INHALATION) at 07:27

## 2023-08-24 RX ADMIN — FUROSEMIDE SCH MG: 10 INJECTION, SOLUTION INTRAMUSCULAR; INTRAVENOUS at 05:13

## 2023-08-24 RX ADMIN — DIPHENHYDRAMINE HYDROCHLORIDE AND HYDROCORTISONE AND NYSTATIN AND TETRACYCLINE HYDROCHLORIDE SCH ML: KIT ORAL at 23:46

## 2023-08-24 RX ADMIN — ALPRAZOLAM PRN MG: 0.25 TABLET ORAL at 17:44

## 2023-08-24 RX ADMIN — HYDROCODONE BITARTRATE AND ACETAMINOPHEN PRN TAB: 5; 325 TABLET ORAL at 17:44

## 2023-08-24 RX ADMIN — SODIUM CHLORIDE SCH MLS/HR: 9 INJECTION, SOLUTION INTRAVENOUS at 18:43

## 2023-08-24 RX ADMIN — HYDROCODONE BITARTRATE AND ACETAMINOPHEN PRN TAB: 5; 325 TABLET ORAL at 11:23

## 2023-08-24 RX ADMIN — WATER SCH MG: 1 INJECTION INTRAMUSCULAR; INTRAVENOUS; SUBCUTANEOUS at 08:47

## 2023-08-24 NOTE — PCM.NOTE
Date and Time: 08/24/23 0712





Subjective Assessment: 





Ms. Brar is a 73 year old female with a pmhx of COPD (4L oxygen at baseline), 

HTN, and CHF who presented with SOB, wheezing, and a NP cough, admitted for COPD

exacerbation and pneumonia being treated with high dose steroids (Solu-Medrol 

125mg/IV), DuoNebs prn, Spiriva, zithromax, and rocephin. CT imaging of the 

chest showing severe emphysema and degenerative changes of the thoracic spine. 

Patient continues with persistent dyspnea. Patient endorses continued shortness 

of breath and wheezing. Additionally the patient was noted with blood in her 

[place] bag yesterday. U/A suspicious for infection. Order placed for F/C to be 

removed. Patient started on Rocephin, urine culture pending. Elevated BNP, IV 

lasix daily. Mild improvement in air flow on auscultation. Seen by Dr. Tiwari

started on aminophylline, solumedrol tapered to 40mg Q8H. Patient with 

persistent severe dyspnea, impaired air movement since yesterday, difficulty 

speaking, and tripod positioning while breathing. Reports that she feels her SOB

is worsening. Agreeable to BIPAP. Also reports increase in back pain and 

anxiety, will start Norco for pain and add xanax prn. 








OBJECTIVE DATA


Vital Signs: 


                               Vital Signs - 24 hr











  Temp Pulse Resp BP Pulse Ox


 


 08/24/23 04:00  97.8 F  109 H  20  140/80  95


 


 08/24/23 03:44   103 H  20   99


 


 08/24/23 00:00  97.5 F  85  22  135/60  94 L


 


 08/23/23 23:05   94 H  20   99


 


 08/23/23 19:59  97.5 F  94 H  27 H  162/85  94 L


 


 08/23/23 18:37   94 H  20   94 L


 


 08/23/23 15:52  96.9 F  87  18  144/86  98


 


 08/23/23 15:13   96 H  18   95


 


 08/23/23 11:56  96.1 F  101 H  18  135/82  95


 


 08/23/23 11:40   94 H  24   97


 


 08/23/23 07:38  96.9 F  91 H  17  161/93  99


 


 08/23/23 07:25   91 H  22   99








                        Pain Assessment - Last Documented











Pain Intensity                 0











Intake and Output: 


                                 Intake & Output











 08/21/23 08/22/23 08/23/23 08/24/23





 11:59 11:59 11:59 11:59


 


Intake Total 6740 988 9389 1176


 


Output Total 2650 1750 2200 1909


 


Balance -1130 -910 -680 -724


 


Weight   54.9 kg 











Lab Results: 


                            Lab Results-Last 24 Hours











  08/23/23 08/23/23 08/24/23 Range/Units





  06:02 11:48 05:17 


 


WBC    22.2 H  (4.0-10.5)  x10^3/uL


 


RBC    3.69 L  (4.1-5.4)  x10^6/uL


 


Hgb    10.6 L  (12.0-16.0)  g/dL


 


Hct    34.9 L  (35-47)  %


 


MCV    94.6  ()  fL


 


MCH    28.7  (26-32)  pg


 


MCHC    30.4 L  (32-36)  g/dL


 


RDW    14.5 H  (11.5-14.0)  %


 


Plt Count    283  (150-450)  x10^3/uL


 


MPV    10.8  (7.5-11.0)  fL


 


Gran %    89.7 H  (36.0-66.0)  %


 


Immature Gran % (Auto)    1.2 H  (0.00-0.4)  %


 


Nucleat RBC Rel Count    0.0  (0.00-0.1)  %


 


Eos # (Auto)    0  (0-0.5)  x10^3/uL


 


Immature Gran # (Auto)    0.26 H  (0.00-0.03)  x10^3u/L


 


Absolute Lymphs (auto)    0.51 L  (1.0-4.6)  x10^3/uL


 


Absolute Monos (auto)    1.46 H  (0.0-1.3)  x10^3/uL


 


Absolute Nucleated RBC    0.00  (0.00-0.01)  x10^3u/L


 


Lymphocytes %    2.3 L  (24.0-44.0)  %


 


Monocytes %    6.6  (0.0-12.0)  %


 


Eosinophils %    0.0  (0.00-5.0)  %


 


Basophils %    0.2  (0.0-0.4)  %


 


Absolute Granulocytes    19.95 H  (1.4-6.9)  x10^3/uL


 


Basophils #    0.05  (0-0.4)  x10^3/uL


 


Sodium     (137-145)  mmol/L


 


Potassium     (3.5-5.1)  mmol/L


 


Chloride     ()  mmol/L


 


Carbon Dioxide     (22-30)  mmol/L


 


Anion Gap     (5-15)  MEQ/L


 


BUN     (7-17)  mg/dL


 


Creatinine     (0.52-1.04)  mg/dL


 


Estimated GFR     ML/MIN


 


Glucose     ()  mg/dL


 


Calcium     (8.4-10.2)  mg/dL


 


Total Bilirubin     (0.2-1.3)  mg/dL


 


AST     (14-36)  U/L


 


ALT     (0-35)  U/L


 


Alkaline Phosphatase     ()  U/L


 


Serum Total Protein     (6.3-8.2)  g/dL


 


Albumin     (3.5-5.0)  g/dL


 


Urine Color   Orange A   (Yellow)  


 


Urine Appearance   Clear   (Clear)  


 


Urine pH   6.0   (4.6-8.0)  


 


Ur Specific Gravity   1.015   (1.005-1.030)  


 


Urine Protein   30   (Negative)  


 


Urine Glucose (UA)   Negative   (Negative)  mg/dL


 


Urine Ketones   Negative   (Negative)  


 


Urine Blood   Large A   (Negative)  


 


Urine Nitrite   Negative   (Negative)  


 


Urine Bilirubin   Negative   (Negative)  


 


Urine Urobilinogen   0.2   (0.2)  mg/dL


 


Ur Leukocyte Esterase   Small A   (Negative)  


 


U Hyaline Cast (Auto)   3-5 A   (0-2)  /LPF


 


Urine Microscopic RBC   >100 A   (0-5)  /HPF


 


Urine Microscopic WBC   6-10 A   (0-5)  /HPF


 


Ur Epithelial Cells   None Seen   (None Seen)  /HPF


 


Urine Bacteria   None Seen   (None Seen)  /HPF


 


Urine Culture Reflexed   YES   (NO)  


 


Theophylline     (10-20)  ug/mL


 


Slides for Path Review  YES    














  08/24/23 08/24/23 Range/Units





  05:17 05:17 


 


WBC    (4.0-10.5)  x10^3/uL


 


RBC    (4.1-5.4)  x10^6/uL


 


Hgb    (12.0-16.0)  g/dL


 


Hct    (35-47)  %


 


MCV    ()  fL


 


MCH    (26-32)  pg


 


MCHC    (32-36)  g/dL


 


RDW    (11.5-14.0)  %


 


Plt Count    (150-450)  x10^3/uL


 


MPV    (7.5-11.0)  fL


 


Gran %    (36.0-66.0)  %


 


Immature Gran % (Auto)    (0.00-0.4)  %


 


Nucleat RBC Rel Count    (0.00-0.1)  %


 


Eos # (Auto)    (0-0.5)  x10^3/uL


 


Immature Gran # (Auto)    (0.00-0.03)  x10^3u/L


 


Absolute Lymphs (auto)    (1.0-4.6)  x10^3/uL


 


Absolute Monos (auto)    (0.0-1.3)  x10^3/uL


 


Absolute Nucleated RBC    (0.00-0.01)  x10^3u/L


 


Lymphocytes %    (24.0-44.0)  %


 


Monocytes %    (0.0-12.0)  %


 


Eosinophils %    (0.00-5.0)  %


 


Basophils %    (0.0-0.4)  %


 


Absolute Granulocytes    (1.4-6.9)  x10^3/uL


 


Basophils #    (0-0.4)  x10^3/uL


 


Sodium  141   (137-145)  mmol/L


 


Potassium  3.9   (3.5-5.1)  mmol/L


 


Chloride  91 L   ()  mmol/L


 


Carbon Dioxide  43 H   (22-30)  mmol/L


 


Anion Gap  10.9   (5-15)  MEQ/L


 


BUN  38 H   (7-17)  mg/dL


 


Creatinine  0.62   (0.52-1.04)  mg/dL


 


Estimated GFR  > 60.0   ML/MIN


 


Glucose  154 H   ()  mg/dL


 


Calcium  7.9 L   (8.4-10.2)  mg/dL


 


Total Bilirubin  0.70   (0.2-1.3)  mg/dL


 


AST  32   (14-36)  U/L


 


ALT  27   (0-35)  U/L


 


Alkaline Phosphatase  68   ()  U/L


 


Serum Total Protein  6.0 L   (6.3-8.2)  g/dL


 


Albumin  3.7   (3.5-5.0)  g/dL


 


Urine Color    (Yellow)  


 


Urine Appearance    (Clear)  


 


Urine pH    (4.6-8.0)  


 


Ur Specific Gravity    (1.005-1.030)  


 


Urine Protein    (Negative)  


 


Urine Glucose (UA)    (Negative)  mg/dL


 


Urine Ketones    (Negative)  


 


Urine Blood    (Negative)  


 


Urine Nitrite    (Negative)  


 


Urine Bilirubin    (Negative)  


 


Urine Urobilinogen    (0.2)  mg/dL


 


Ur Leukocyte Esterase    (Negative)  


 


U Hyaline Cast (Auto)    (0-2)  /LPF


 


Urine Microscopic RBC    (0-5)  /HPF


 


Urine Microscopic WBC    (0-5)  /HPF


 


Ur Epithelial Cells    (None Seen)  /HPF


 


Urine Bacteria    (None Seen)  /HPF


 


Urine Culture Reflexed    (NO)  


 


Theophylline   3.9 L  (10-20)  ug/mL


 


Slides for Path Review    











Radiology Exams: 


                              Radiology Procedures











 Category Date Time Status


 


 CHEST 2 VIEWS (PA AND LAT) Routine Exams  08/22/23 08:00 Completed











Multi-Disciplinary Progress Notes: 


                        Multi-Disciplinary Progress Notes





08/23/23 10:59 Case Management Note by Roxy Campbell


NO CHANGE IN DC PLANS AT THIS TIME





Initialized on 08/23/23 10:59 - END OF NOTE

















Assessment/Plan


(1) Acute exacerbation of chronic obstructive airways disease


Current Visit: Yes   Status: Acute   


Assessment & Plan: 


72 y/o F with severe emphysema, on 4L at home, p/w COPD exacerbation with some 

contribution of pneumonia. CT Chest shows severe emphysematous changes, and 

patient with persistent severe dyspnea, impaired air movement, difficulty 

speaking, and tripod positioning while breathing. She declined use of BiPAP.





- Continue methylprednisone 40 mg Q8 per Dr. Te Orlando 


- Spirivia


- Continue Mucinex 


-Dr. Carroll has started aminophylline


-Patient placed on BIPAP





Code(s): J44.1 - CHRONIC OBSTRUCTIVE PULMONARY DISEASE W (ACUTE) EXACERBATION   





(2) Pneumonia


Current Visit: Yes   Status: Acute   


Assessment & Plan: 


Rocephin, Zithromax


- Chest XR: 8/19


Nonacute chest with chronic features


- CT chest: 8/19 reviewed with findings of:


1. Emphysematous lungs with mild centriacinar emphysema in both lower lobes.


There is hyperinflation of the left lung with trans mediastinal herniation.


2. Small focal consolidation is seen in the left lower lobe SE 3 image #31.


3. No soliatry pulmonary nodule or definite lymphadenopathy to suggest


metastases.


4. Marked degenerative changes of the thoracic spine with kyphotic deformity


and compression fracture


-Repeat CXR tomorrow


8/22:


-CXR less inflated and grossly unchanged again demonstrating COPD,


mild bibasilar subsegmental atelectasis/scarring, and right lung suture


material/surgical clips.  Heart not enlarged again with tortuous descending


aorta.  No new/acute abnormalities.


-D/c zithromax, continue rocephin due to questionable UTI





Code(s): J18.9 - PNEUMONIA, UNSPECIFIED ORGANISM   





(3) UTI (urinary tract infection)


Current Visit: Yes   Status: Acute   


Assessment & Plan: 


-UA suspicious for UTI, will treat empirically with Rocephin pending culture


-D/C F/C, may replace with new F/C need new urine once cathed


8/24:


-u/a collected 8/23 suspicious for infection, pending culture, continue on 

rocephin


Code(s): N39.0 - URINARY TRACT INFECTION, SITE NOT SPECIFIED   





(4) Back pain, acute


Current Visit: No   Status: Acute   


Qualifiers: 


   Back pain location: low back pain 


Assessment & Plan: 


- Degenerative changes as seen per CT


- d/c naprosyn, add norco 


Code(s): M54.9 - DORSALGIA, UNSPECIFIED   





(5) CHF (congestive heart failure)


Current Visit: No   Status: Acute   


Qualifiers: 


   Heart failure type: systolic   Heart failure chronicity: acute on chronic   

Qualified Code(s): I50.23 - Acute on chronic systolic (congestive) heart failure

  


Assessment & Plan: 


- Chronic, no recent echo


-Strict I&O


-Monitor renal/lytes with goal K>4, MG >2


-BNP at 1080


- Continue Lasix 80mg BID, add one time dose of IV lasix 40mg


-Repeat BNP tomorrow


8/22:


-BNP at 1920, may be secondary to COPD exacerbation, does not appear to be in 

exacerbation, no noted edema on exam


8/24:


-Lasix changed from daily dose to IV lasix 40mg bid


Code(s): I50.9 - HEART FAILURE, UNSPECIFIED

## 2023-08-24 NOTE — CONS
CONSULT DATE:     08/23/2023



HISTORY OF PRESENT ILLNESS:     Ms. Brar is a 73 year-old woman with history of 
end-stage chronic obstructive pulmonary disease, well known to me, who has been admitted 
to [facility]. The patient has been getting progressively short of breath for about 5 days 
before this admission. She presented to the Emergency Room with worsening shortness of 
breath and after initial evaluation, has been hospitalized since 08/19/2023. The patient 
is being treated with IV antibiotics, steroids, bronchodilators, and continuation of home 
medications. She does report marginal clinical improvement. However, still continues to 
have audible wheezing.



She has cough which has been productive of minimal expectoration. She reports generalized 
chest pain from coughing. 



PAST MEDICAL HISTORY:  Positive for history of chronic obstructive pulmonary disease, 
prior history of lung cancer, osteopenia, hypoxemia, recurrent bronchitis.



PAST SURGICAL HISTORY:  She has had hysterectomy in '91, rectum reconstruction, bladder 
reconstruction, right knee surgery, back surgery, right hand surgery, and bronchoscopy.



PERSONAL AND SOCIAL HISTORY:  Patient is a former smoker, quit smoking in 2007. She lives 
with family.



ALLERGIES:  NOTED.

CURRENT MEDICATIONS:  Reviewed.



PHYSICAL EXAMINATION:  Elderly, frail woman appears tachypneic at rest.

VITAL SIGNS:  Noted.

HEENT:  Normocephalic. Oral exam - She is edentulous.

NECK:  Supple.

CVS: 1st and 2nd heart sounds with mild tachycardia.

RESPIRATORY:  Shows significantly increased AP diameter of chest. Breath sounds are 
significantly diminished. Bilateral fairly diffuse rhonchi are heard.  

ABDOMEN:  Soft. 

EXTREMITIES:  No significant edema is noted. Apparently, patient did have edema on 
admission.



LABORATORY DATA:  Radiology tests were reviewed. Cultures remain negative. WBC is 16.4. CT 
chest on 08/19/2023 showed emphysematous lungs with centriacinar emphysema in lower lobes, 
hyperinflation, consolidation of left lower lobe, and degenerative changes of spine.



ASSESSMENT:

This is a 73 year-old woman admitted with:

1.  LEFT LOWER LOBE COMMUNITY ACQUIRED PNEUMONIA.

2.  CHRONIC OBSTRUCTIVE PULMONARY DISEASE WITH ACUTE EXACERBATION.

3.  CHRONIC HYPOXIC RESPIRATORY FAILURE.

4.  PRIOR HISTORY OF LUNG CANCER IN REMISSION.

5.  DEGENERATIVE JOINT DISEASE OF THORACIC SPINE.



RECOMMENDATIONS:

1.  Continue present treatment.

2.  Continue IV steroids.

3.  Continue bronchodilators. Will benefit from short course of IV aminophylline while 
monitoring heart rate.

4.  Patient still has significant clinical bronchospasm. May require longer length of stay 
along with possible rehab given her debilitation. 



Will follow during this stay. Will be available as needed.



Thank you for allowing me to participate in the care of this patient.

## 2023-08-24 NOTE — PROG NOTE
DATE:  08/24/2023



Events noted. 



Patient reports some clinical improvement. Was started on aminophylline drip, although is 
having difficulty with IV access. Voices no new complaints.



VITAL SIGNS NOTED.

HEENT:  Normocephalic. Oral exam limited. Edentulous.

NECK:  Supple.

CVS:  1st and 2nd heart sounds normal, regular rhythm.

RESPIRATORY:  Shows increase in AP diameter with diminished breath sounds. Scattered 
rhonchi are heard.

ABDOMEN:  Soft.

EXTREMITIES:  No edema is noted.



LABS REVIEWED.



ASSESSMENT:

73 year-old woman admitted with:

1.  LEFT LOWER LOBE COMMUNITY ACQUIRED PNEUMONIA.

2.  END-STAGE CHRONIC OBSTRUCTIVE PULMONARY DISEASE WITH EXACERBATION.

3.  CHRONIC HYPOXIC RESPIRATORY FAILURE.

4.  PRIOR HISTORY OF LUNG CANCER.

5.  DEGENERATIVE JOINT DISEASE.



RECOMMENDATIONS:

1.  Continue present treatment.

2.  Patient will benefit from midline given need for IV medications and lack of adequate 
peripheral IV access.

3.  Continue aminophylline drip for at least 48 hours to gain maximum benefit.

4.  Gradual steroid taper.

5.  Continue antibiotics.

6.  Follow-up chest x-ray and other supportive care.

## 2023-08-25 LAB
ALBUMIN SERPL-MCNC: 3.4 G/DL (ref 3.5–5)
ALP SERPL-CCNC: 63 U/L (ref 38–126)
ALT SERPL-CCNC: 25 U/L (ref 0–35)
ANION GAP SERPL CALC-SCNC: 3.3 MEQ/L (ref 5–15)
ARTERIAL PATENCY WRIST A: (no result)
AST SERPL QL: 26 U/L (ref 14–36)
BASE EXCESS BLDA CALC-SCNC: 21.1 MMOL/L (ref -2–2)
BASOPHILS # BLD AUTO: 0.04 X10^3/UL (ref 0–0.4)
BASOPHILS NFR BLD AUTO: 0.2 % (ref 0–0.4)
BILIRUB BLD-MCNC: 0.7 MG/DL (ref 0.2–1.3)
BUN SERPL-MCNC: 35 MG/DL (ref 7–17)
CALCIUM SPEC-MCNC: 8.3 MG/DL (ref 8.4–10.2)
CHLORIDE SERPL-SCNC: 90 MMOL/L (ref 98–107)
CO2 SERPL-SCNC: 41 MMOL/L (ref 22–30)
COHGB BLD-MCNC: 1.3 % THGB (ref 0–6.9)
CREAT SERPL-MCNC: 0.78 MG/DL (ref 0.52–1.04)
EOSINOPHIL # BLD AUTO: 0 X10^3/UL (ref 0–0.5)
GAS PNL BLDA: 10.4
GAS PNL BLDA: 37 C
GFR SERPLBLD BASED ON 1.73 SQ M-ARVRAT: > 60 ML/MIN
GLUCOSE SERPL-MCNC: 144 MG/DL (ref 74–106)
HCO3 BLDA-SCNC: 47.7 MMOL/L (ref 22–28)
HCT VFR BLD AUTO: 31.3 % (ref 35–47)
HGB BLD-MCNC: 9.6 G/DL (ref 12–16)
IMM GRANULOCYTES # BLD: 0.29 X10^3U/L (ref 0–0.03)
IMM GRANULOCYTES NFR BLD: 1.3 % (ref 0–0.4)
INHALED O2 CONCENTRATION: 32 %
LYMPHOCYTES # SPEC AUTO: 0.53 X10^3/UL (ref 1–4.6)
MCH RBC QN AUTO: 28.9 PG (ref 26–32)
MCHC RBC AUTO-ENTMCNC: 30.7 G/DL (ref 32–36)
METHGB MFR BLDA: 1 % (ref 1.4–1.5)
MONOCYTES # BLD AUTO: 1.16 X10^3/UL (ref 0–1.3)
NRBC # BLD AUTO: 0 X10^3U/L (ref 0–0.01)
NRBC BLD AUTO-RTO: 0 % (ref 0–0.1)
O2 A-A PPRESDIFF RESPIRATORY: 44 MM[HG]
PCO2 BLDA: 64 MMHG (ref 35–45)
PLATELET # BLD AUTO: 243 X10^3/UL (ref 150–450)
PO2 BLDA: 104 MMHG (ref 75–100)
POTASSIUM BLDA-SCNC: 3.8 MMOL/L (ref 3.5–5.1)
POTASSIUM SERPLBLD-SCNC: 3.7 MMOL/L (ref 3.5–5.1)
PROT SERPL-MCNC: 5.6 G/DL (ref 6.3–8.2)
RBC # BLD AUTO: 3.32 X10^6/UL (ref 4.1–5.4)
SAO2 % BLDA FROM PO2: 0.7 %
SAO2 % BLDA: 97.3 G/DF (ref 94–100)
SAO2 % BLDA: 99.6 % (ref 95–100)
SODIUM SERPL-SCNC: 138 MMOL/L (ref 137–145)
SPECIMEN SOURCE: (no result)
WBC # BLD AUTO: 21.8 X10^3/UL (ref 4–10.5)

## 2023-08-25 RX ADMIN — POTASSIUM CHLORIDE SCH MEQ: 10 TABLET, EXTENDED RELEASE ORAL at 21:59

## 2023-08-25 RX ADMIN — IPRATROPIUM BROMIDE AND ALBUTEROL SULFATE SCH ML: .5; 3 SOLUTION RESPIRATORY (INHALATION) at 03:50

## 2023-08-25 RX ADMIN — CEFTRIAXONE SCH MLS/HR: 1 INJECTION, SOLUTION INTRAVENOUS at 09:06

## 2023-08-25 RX ADMIN — LORAZEPAM PRN MG: 2 INJECTION, SOLUTION INTRAMUSCULAR; INTRAVENOUS at 15:07

## 2023-08-25 RX ADMIN — IPRATROPIUM BROMIDE AND ALBUTEROL SULFATE SCH: .5; 3 SOLUTION RESPIRATORY (INHALATION) at 14:45

## 2023-08-25 RX ADMIN — SIMVASTATIN SCH MG: 20 TABLET, FILM COATED ORAL at 21:59

## 2023-08-25 RX ADMIN — LORAZEPAM PRN MG: 2 INJECTION, SOLUTION INTRAMUSCULAR; INTRAVENOUS at 04:20

## 2023-08-25 RX ADMIN — FUROSEMIDE SCH MG: 10 INJECTION, SOLUTION INTRAMUSCULAR; INTRAVENOUS at 14:53

## 2023-08-25 RX ADMIN — LORAZEPAM PRN MG: 2 INJECTION, SOLUTION INTRAMUSCULAR; INTRAVENOUS at 19:10

## 2023-08-25 RX ADMIN — POLYVINYL ALCOHOL SCH ML: 14 SOLUTION/ DROPS OPHTHALMIC at 17:39

## 2023-08-25 RX ADMIN — IPRATROPIUM BROMIDE AND ALBUTEROL SULFATE SCH ML: .5; 3 SOLUTION RESPIRATORY (INHALATION) at 22:34

## 2023-08-25 RX ADMIN — Medication SCH TAB: at 08:49

## 2023-08-25 RX ADMIN — WATER SCH MG: 1 INJECTION INTRAMUSCULAR; INTRAVENOUS; SUBCUTANEOUS at 21:57

## 2023-08-25 RX ADMIN — CARVEDILOL SCH MG: 12.5 TABLET, FILM COATED ORAL at 21:59

## 2023-08-25 RX ADMIN — TIOTROPIUM BROMIDE SCH EA: 18 CAPSULE ORAL; RESPIRATORY (INHALATION) at 07:05

## 2023-08-25 RX ADMIN — IPRATROPIUM BROMIDE AND ALBUTEROL SULFATE SCH ML: .5; 3 SOLUTION RESPIRATORY (INHALATION) at 15:11

## 2023-08-25 RX ADMIN — CARVEDILOL SCH MG: 12.5 TABLET, FILM COATED ORAL at 08:50

## 2023-08-25 RX ADMIN — WATER SCH MG: 1 INJECTION INTRAMUSCULAR; INTRAVENOUS; SUBCUTANEOUS at 14:51

## 2023-08-25 RX ADMIN — BUSPIRONE HYDROCHLORIDE SCH MG: 5 TABLET ORAL at 21:59

## 2023-08-25 RX ADMIN — NITROGLYCERIN SCH MG: 2.5 CAPSULE ORAL at 08:50

## 2023-08-25 RX ADMIN — MAGNESIUM OXIDE TAB 400 MG (241.3 MG ELEMENTAL MG) SCH MG: 400 (241.3 MG) TAB at 08:50

## 2023-08-25 RX ADMIN — IPRATROPIUM BROMIDE AND ALBUTEROL SULFATE SCH ML: .5; 3 SOLUTION RESPIRATORY (INHALATION) at 06:55

## 2023-08-25 RX ADMIN — GUAIFENESIN SCH MG: 600 TABLET, EXTENDED RELEASE ORAL at 08:49

## 2023-08-25 RX ADMIN — LORAZEPAM PRN MG: 2 INJECTION, SOLUTION INTRAMUSCULAR; INTRAVENOUS at 21:58

## 2023-08-25 RX ADMIN — POTASSIUM CHLORIDE SCH MEQ: 10 TABLET, EXTENDED RELEASE ORAL at 08:50

## 2023-08-25 RX ADMIN — ASPIRIN SCH MG: 81 TABLET, COATED ORAL at 08:50

## 2023-08-25 RX ADMIN — DIPHENHYDRAMINE HYDROCHLORIDE AND HYDROCORTISONE AND NYSTATIN AND TETRACYCLINE HYDROCHLORIDE SCH ML: KIT ORAL at 17:39

## 2023-08-25 RX ADMIN — FUROSEMIDE SCH MG: 10 INJECTION, SOLUTION INTRAMUSCULAR; INTRAVENOUS at 06:29

## 2023-08-25 RX ADMIN — FUROSEMIDE SCH MG: 10 INJECTION, SOLUTION INTRAMUSCULAR; INTRAVENOUS at 21:58

## 2023-08-25 RX ADMIN — POLYVINYL ALCOHOL SCH ML: 14 SOLUTION/ DROPS OPHTHALMIC at 09:24

## 2023-08-25 RX ADMIN — POLYVINYL ALCOHOL SCH: 14 SOLUTION/ DROPS OPHTHALMIC at 14:37

## 2023-08-25 RX ADMIN — BUSPIRONE HYDROCHLORIDE SCH MG: 5 TABLET ORAL at 08:50

## 2023-08-25 RX ADMIN — LORAZEPAM PRN MG: 2 INJECTION, SOLUTION INTRAMUSCULAR; INTRAVENOUS at 08:48

## 2023-08-25 RX ADMIN — IPRATROPIUM BROMIDE AND ALBUTEROL SULFATE SCH ML: .5; 3 SOLUTION RESPIRATORY (INHALATION) at 18:21

## 2023-08-25 RX ADMIN — SODIUM CHLORIDE SCH MLS/HR: 9 INJECTION, SOLUTION INTRAVENOUS at 20:05

## 2023-08-25 RX ADMIN — IPRATROPIUM BROMIDE AND ALBUTEROL SULFATE SCH ML: .5; 3 SOLUTION RESPIRATORY (INHALATION) at 11:18

## 2023-08-25 RX ADMIN — GUAIFENESIN SCH MG: 600 TABLET, EXTENDED RELEASE ORAL at 21:59

## 2023-08-25 RX ADMIN — POLYVINYL ALCOHOL SCH ML: 14 SOLUTION/ DROPS OPHTHALMIC at 21:59

## 2023-08-25 RX ADMIN — DIPHENHYDRAMINE HYDROCHLORIDE AND HYDROCORTISONE AND NYSTATIN AND TETRACYCLINE HYDROCHLORIDE SCH: KIT ORAL at 14:37

## 2023-08-25 RX ADMIN — DIPHENHYDRAMINE HYDROCHLORIDE AND HYDROCORTISONE AND NYSTATIN AND TETRACYCLINE HYDROCHLORIDE SCH ML: KIT ORAL at 06:29

## 2023-08-25 RX ADMIN — WATER SCH MG: 1 INJECTION INTRAMUSCULAR; INTRAVENOUS; SUBCUTANEOUS at 06:29

## 2023-08-25 NOTE — PCM.NOTE
Date and Time: 08/25/23  0995





Subjective Assessment: 


Ms. Brar is a 73 year old female with a pmhx of COPD (4L oxygen at baseline), 

HTN, and CHF who presented with SOB, wheezing, and a NP cough, admitted for COPD

exacerbation and pneumonia being treated with high dose steroids (Solu-Medrol 

40mg/IV), DuoNebs prn, Spiriva, zithromax, and rocephin. CT imaging of the chest

showing severe emphysema and degenerative changes of the thoracic spine. Patient

continues with persistent dyspnea. Patient endorses continued shortness of 

breath and wheezing. Additionally the patient was noted with blood in her [place] 

bag. U/A suspicious for infection. Order placed for F/C to be removed and 

replaced. Patient started on Rocephin, urine culture showing no growth to date. 

Elevated BNP, IV lasix daily. Seen by Dr. Tiwari started on aminophylline, 

solumedrol tapered to 40mg Q8H. Patient with persistent severe dyspnea, impaired

air movement since yesterday, difficulty speaking, and tripod positioning while 

breathing. Placed on BIPAP. Patient was able to tolerate BIPAP for a total of 8 

hours last night, but has been refusing. Lung sounds diminished on auscultation.

Goals of care discussed with patient and the importance of the BIPAP and the 

possibility of intubation as patient is declining, refusing BIPAP, and remains a

full code. Patient requesting to speak with  before deciding on code 

status but for now has agreed to go back on the BIPAP. Endorses that she is 

feeling worse today, shortness of breath and fatigue have increased. 








- Review of Systems


Constitutional: No Symptoms, Fatigue


Eyes: No Symptoms


Ears, Nose, & Throat: No Symptoms


Respiratory: Cough, Orthopnea, Short Of Breath, Wheezing


Cardiac: No Symptoms


Abdominal/Gastrointestinal: No Symptoms


Genitourinary Symptoms: No Symptoms, Other (FC)


Musculoskeletal: Back Pain


Skin: Other (scattered bruising from multiple blood draws/IV)


Psychological: Anxiety





Objective Exam


General Appearance: moderate distress


Neurologic Exam: alert, oriented x 3


Skin Exam: pale


Eye Exam: PERRL


Respiratory Exam: respiratory distress, diminished breath sounds, wheezing


Cardiovascular Exam: regular rate/rhythm


Gastrointestinal/Abdomen Exam: soft, normal bowel sounds


Back Exam: normal inspection





OBJECTIVE DATA


Vital Signs: 


                               Vital Signs - 24 hr











  Temp Pulse Resp BP BP Pulse Ox


 


 08/25/23 03:57   87  15    95


 


 08/25/23 03:49   79  13   


 


 08/24/23 23:22   95 H  16    97


 


 08/24/23 23:19  97.8 F  95 H  23   140/84  99


 


 08/24/23 22:38   90  23  140/84  


 


 08/24/23 20:43   87  18    97


 


 08/24/23 20:00  97.8 F  95 H  19   143/84  94 L


 


 08/24/23 19:54  97.8 F  95 H  19   143/84  94 L


 


 08/24/23 16:00  96.2 F  92 H  24   166/94  95


 


 08/24/23 15:21   88  26 H    97


 


 08/24/23 10:59  96.6 F  88  28 H   139/99  97


 


 08/24/23 10:54   88  26 H    97


 


 08/24/23 07:28   96 H  22    99


 


 08/24/23 07:13  97.1 F  95 H  20   141/92  95








                        Pain Assessment - Last Documented











Pain Intensity                 0


 


Pain Scale Used                0-10 Pain Scale











Intake and Output: 


                                 Intake & Output











 08/22/23 08/23/23 08/24/23 08/25/23





 11:59 11:59 11:59 11:59


 


Intake Total 840 1520 1236 1013


 


Output Total 1750 2200 1900 2450


 


Balance -171 -365 -819 -0422


 


Weight  54.9 kg  











Lab Results: 


                            Lab Results-Last 24 Hours











  08/24/23 08/24/23 08/24/23 Range/Units





  05:17 05:17 05:17 


 


WBC  22.2 H    (4.0-10.5)  x10^3/uL


 


RBC  3.69 L    (4.1-5.4)  x10^6/uL


 


Hgb  10.6 L    (12.0-16.0)  g/dL


 


Hct  34.9 L    (35-47)  %


 


MCV  94.6    ()  fL


 


MCH  28.7    (26-32)  pg


 


MCHC  30.4 L    (32-36)  g/dL


 


RDW  14.5 H    (11.5-14.0)  %


 


Plt Count  283    (150-450)  x10^3/uL


 


MPV  10.8    (7.5-11.0)  fL


 


Gran %  89.7 H    (36.0-66.0)  %


 


Immature Gran % (Auto)  1.2 H    (0.00-0.4)  %


 


Nucleat RBC Rel Count  0.0    (0.00-0.1)  %


 


Eos # (Auto)  0    (0-0.5)  x10^3/uL


 


Immature Gran # (Auto)  0.26 H    (0.00-0.03)  x10^3u/L


 


Absolute Lymphs (auto)  0.51 L    (1.0-4.6)  x10^3/uL


 


Absolute Monos (auto)  1.46 H    (0.0-1.3)  x10^3/uL


 


Absolute Nucleated RBC  0.00    (0.00-0.01)  x10^3u/L


 


Lymphocytes %  2.3 L    (24.0-44.0)  %


 


Monocytes %  6.6    (0.0-12.0)  %


 


Eosinophils %  0.0    (0.00-5.0)  %


 


Basophils %  0.2    (0.0-0.4)  %


 


Absolute Granulocytes  19.95 H    (1.4-6.9)  x10^3/uL


 


Basophils #  0.05    (0-0.4)  x10^3/uL


 


Sodium   141   (137-145)  mmol/L


 


Potassium   3.9   (3.5-5.1)  mmol/L


 


Chloride   91 L   ()  mmol/L


 


Carbon Dioxide   43 H   (22-30)  mmol/L


 


Anion Gap   10.9   (5-15)  MEQ/L


 


BUN   38 H   (7-17)  mg/dL


 


Creatinine   0.62   (0.52-1.04)  mg/dL


 


Estimated GFR   > 60.0   ML/MIN


 


Glucose   154 H   ()  mg/dL


 


Calcium   7.9 L   (8.4-10.2)  mg/dL


 


Total Bilirubin   0.70   (0.2-1.3)  mg/dL


 


AST   32   (14-36)  U/L


 


ALT   27   (0-35)  U/L


 


Alkaline Phosphatase   68   ()  U/L


 


Serum Total Protein   6.0 L   (6.3-8.2)  g/dL


 


Albumin   3.7   (3.5-5.0)  g/dL


 


Theophylline    3.9 L  (10-20)  ug/mL


 


Slides for Path Review  YES    














  08/25/23 08/25/23 Range/Units





  04:35 04:35 


 


WBC  21.8 H   (4.0-10.5)  x10^3/uL


 


RBC  3.32 L   (4.1-5.4)  x10^6/uL


 


Hgb  9.6 L   (12.0-16.0)  g/dL


 


Hct  31.3 L   (35-47)  %


 


MCV  94.3   ()  fL


 


MCH  28.9   (26-32)  pg


 


MCHC  30.7 L   (32-36)  g/dL


 


RDW  14.1 H   (11.5-14.0)  %


 


Plt Count  243   (150-450)  x10^3/uL


 


MPV  10.5   (7.5-11.0)  fL


 


Gran %  90.8 H   (36.0-66.0)  %


 


Immature Gran % (Auto)  1.3 H   (0.00-0.4)  %


 


Nucleat RBC Rel Count  0.0   (0.00-0.1)  %


 


Eos # (Auto)  0   (0-0.5)  x10^3/uL


 


Immature Gran # (Auto)  0.29 H   (0.00-0.03)  x10^3u/L


 


Absolute Lymphs (auto)  0.53 L   (1.0-4.6)  x10^3/uL


 


Absolute Monos (auto)  1.16   (0.0-1.3)  x10^3/uL


 


Absolute Nucleated RBC  0.00   (0.00-0.01)  x10^3u/L


 


Lymphocytes %  2.4 L   (24.0-44.0)  %


 


Monocytes %  5.3   (0.0-12.0)  %


 


Eosinophils %  0.0   (0.00-5.0)  %


 


Basophils %  0.2   (0.0-0.4)  %


 


Absolute Granulocytes  19.76 H   (1.4-6.9)  x10^3/uL


 


Basophils #  0.04   (0-0.4)  x10^3/uL


 


Sodium   138  (137-145)  mmol/L


 


Potassium   3.7  (3.5-5.1)  mmol/L


 


Chloride   90 L  ()  mmol/L


 


Carbon Dioxide   41 H  (22-30)  mmol/L


 


Anion Gap   Pending  (5-15)  MEQ/L


 


BUN   35 H  (7-17)  mg/dL


 


Creatinine   0.78  (0.52-1.04)  mg/dL


 


Estimated GFR   > 60.0  ML/MIN


 


Glucose   144 H  ()  mg/dL


 


Calcium   8.3 L  (8.4-10.2)  mg/dL


 


Total Bilirubin   0.70  (0.2-1.3)  mg/dL


 


AST   26  (14-36)  U/L


 


ALT   25  (0-35)  U/L


 


Alkaline Phosphatase   63  ()  U/L


 


Serum Total Protein   5.6 L  (6.3-8.2)  g/dL


 


Albumin   3.4 L  (3.5-5.0)  g/dL


 


Theophylline    (10-20)  ug/mL


 


Slides for Path Review    











Multi-Disciplinary Progress Notes: 


                        Multi-Disciplinary Progress Notes





08/25/23 04:10 Respiratory Note by Ruperto Fuentes


AROUND 0100 PT WOKE UP AND STARTED MESSING WITH HER MASK AND NURSING ATTEMPTED 

TO PLACE IT BACK ON HER.  I WAS DRAWING AN ABG IN ANOTHER PTS RM AT THE TIME AND

NURSING STATED THAT THE STRAPS HAD COME UNDONE AND IT WAS TOO CONFUSING TO PLACE

BACK ON THE PT SO SHE PUT HER ON THE 3LPM O2 NC.  WHEN I ENTERED PT RM FOR HER 

0300 NEB TX, I PUT IT INLINE WITH THE BIPAP AND ATTEMPTED TO PLACE IT ON THE PT 

FACE AS SHE SAID SHE WOULD TRY IT.  PT KEPT PUSHING IT AWAY AND STATING SHE 

DIDN'T KNOW IF SHE COULD DO IT AGAIN.  I FINALLY PLACED PT BACK ON 3LPM NC AND 

GAVE THE TX VIA MOUTHPIECE.  POST TX I PLACED PT BACK ON 3LPM O2 NC AS PT STATED

SHE WAS UNSURE IF SHE COULD WEAR IT, HER MEDS HAD WORN OFF AND HER ANXIETY WAS 

TOO HIGH.





Initialized on 08/25/23 04:10 - END OF NOTE








08/24/23 12:26 Case Management Note by Roxy Campbell


S/W PATIENT TO SEE IF SHE WOULD BE INTERESTED IN A REHAB STAY WHEN READY FOR DC-

PATIENT AGAIN DECLINED. SHE FELT LIKE HER SIG OTHER WAS ENOUGH ASSISTANCE FOR 

HER AT HOME AT THIS TIME





Initialized on 08/24/23 12:26 - END OF NOTE








08/24/23 09:40 Case Management Note by Roxy Campbell


NO CHANGE IN DC PLANS AT THIS TIME- WILL CONTINUE TO FOLLOW





Initialized on 08/24/23 09:40 - END OF NOTE








08/24/23 09:01 Pharmacy Note by Mathew,Scottie


Aminophylline drip at 20mg/hr. or 480mg per day. Max dose is 500mg/day per Johana-

Comp. Grayson level 3.9 after about 10 hours of infusion.


Will check level again the next two days and adjust dose if needed.








   ** Electronically signed by Scottie Carmona on 08/24/23 09:03 **


Initialized on 08/24/23 09:01 - END OF NOTE

















Assessment/Plan


(1) Acute exacerbation of chronic obstructive airways disease


Current Visit: Yes   Status: Acute   


Assessment & Plan: 


74 y/o F with severe emphysema, on 4L at home, p/w COPD exacerbation with some 

contribution of pneumonia. CT Chest shows severe emphysematous changes, and 

patient with persistent severe dyspnea, impaired air movement, difficulty 

speaking, and tripod positioning while breathing. She declined use of BiPAP, but

for now is agreeing to be placed back on.





- Continue methylprednisone 40 mg Q8 per Dr. Te Orlando 


- Spirivia


- Continue Mucinex 


-Dr. Carroll has started aminophylline


-Patient placed on BIPAP


-Consider CT 





Code(s): J44.1 - CHRONIC OBSTRUCTIVE PULMONARY DISEASE W (ACUTE) EXACERBATION   





(2) Pneumonia


Current Visit: Yes   Status: Acute   


Assessment & Plan: 


Rocephin, Zithromax


- Chest XR: 8/19


Nonacute chest with chronic features


- CT chest: 8/19 reviewed with findings of:


1. Emphysematous lungs with mild centriacinar emphysema in both lower lobes.


There is hyperinflation of the left lung with trans mediastinal herniation.


2. Small focal consolidation is seen in the left lower lobe SE 3 image #31.


3. No soliatry pulmonary nodule or definite lymphadenopathy to suggest


metastases.


4. Marked degenerative changes of the thoracic spine with kyphotic deformity


and compression fracture


-Repeat CXR tomorrow


8/22:


-CXR less inflated and grossly unchanged again demonstrating COPD,


mild bibasilar subsegmental atelectasis/scarring, and right lung suture


material/surgical clips.  Heart not enlarged again with tortuous descending


aorta.  No new/acute abnormalities.


-D/c zithromax, continue rocephin due to questionable UTI





Code(s): J18.9 - PNEUMONIA, UNSPECIFIED ORGANISM   





(3) UTI (urinary tract infection)


Current Visit: Yes   Status: Acute   


Assessment & Plan: 


-UA suspicious for UTI, will treat empirically with Rocephin pending culture


-D/C F/C, may replace with new F/C need new urine once cathed


8/24:


-u/a collected 8/23 suspicious for infection, pending culture, continue on 

rocephin


8/25/23:


-U cult negative


Code(s): N39.0 - URINARY TRACT INFECTION, SITE NOT SPECIFIED   





(4) Back pain, acute


Current Visit: No   Status: Acute   


Qualifiers: 


   Back pain location: low back pain 


Assessment & Plan: 


- Degenerative changes as seen per CT


- d/c naprosyn, add norco 


Code(s): M54.9 - DORSALGIA, UNSPECIFIED   





(5) CHF (congestive heart failure)


Current Visit: No   Status: Acute   


Qualifiers: 


   Heart failure type: systolic   Heart failure chronicity: acute on chronic   

Qualified Code(s): I50.23 - Acute on chronic systolic (congestive) heart failure

  


Assessment & Plan: 


- Chronic, no recent echo


-Strict I&O


-Monitor renal/lytes with goal K>4, MG >2


-BNP at 1080


- Continue Lasix 80mg BID, add one time dose of IV lasix 40mg


-Repeat BNP tomorrow


8/22:


-BNP at 1920, may be secondary to COPD exacerbation, does not appear to be in 

exacerbation, no noted edema on exam


8/24:


-Lasix changed from daily dose to IV lasix 40mg bid


Code(s): I50.9 - HEART FAILURE, UNSPECIFIED

## 2023-08-26 LAB
ALBUMIN SERPL-MCNC: 3.6 G/DL (ref 3.5–5)
ALP SERPL-CCNC: 67 U/L (ref 38–126)
ALT SERPL-CCNC: 26 U/L (ref 0–35)
ANION GAP SERPL CALC-SCNC: 9.4 MEQ/L (ref 5–15)
AST SERPL QL: 31 U/L (ref 14–36)
BILIRUB BLD-MCNC: 0.8 MG/DL (ref 0.2–1.3)
BUN SERPL-MCNC: 36 MG/DL (ref 7–17)
CALCIUM SPEC-MCNC: 9 MG/DL (ref 8.4–10.2)
CELLS COUNTED: 100
CHLORIDE SERPL-SCNC: 89 MMOL/L (ref 98–107)
CO2 SERPL-SCNC: 44 MMOL/L (ref 22–30)
CREAT SERPL-MCNC: 0.77 MG/DL (ref 0.52–1.04)
GFR SERPLBLD BASED ON 1.73 SQ M-ARVRAT: > 60 ML/MIN
GLUCOSE SERPL-MCNC: 167 MG/DL (ref 74–106)
HCT VFR BLD AUTO: 33.3 % (ref 35–47)
HGB BLD-MCNC: 10.5 G/DL (ref 12–16)
MCH RBC QN AUTO: 29.1 PG (ref 26–32)
MCHC RBC AUTO-ENTMCNC: 31.5 G/DL (ref 32–36)
NEUTS BAND # BLD MANUAL: 2 % (ref 0–2)
PLATELET # BLD AUTO: 306 X10^3/UL (ref 150–450)
POTASSIUM SERPLBLD-SCNC: 3.4 MMOL/L (ref 3.5–5.1)
PROT SERPL-MCNC: 6 G/DL (ref 6.3–8.2)
RBC # BLD AUTO: 3.61 X10^6/UL (ref 4.1–5.4)
SODIUM SERPL-SCNC: 139 MMOL/L (ref 137–145)
TOXIC GRANULES BLD QL SMEAR: (no result)
WBC # BLD AUTO: 25.5 X10^3/UL (ref 4–10.5)

## 2023-08-26 RX ADMIN — IPRATROPIUM BROMIDE AND ALBUTEROL SULFATE SCH ML: .5; 3 SOLUTION RESPIRATORY (INHALATION) at 03:00

## 2023-08-26 RX ADMIN — BUSPIRONE HYDROCHLORIDE SCH MG: 5 TABLET ORAL at 22:56

## 2023-08-26 RX ADMIN — LORAZEPAM PRN MG: 2 INJECTION, SOLUTION INTRAMUSCULAR; INTRAVENOUS at 20:47

## 2023-08-26 RX ADMIN — GUAIFENESIN SCH: 600 TABLET, EXTENDED RELEASE ORAL at 10:38

## 2023-08-26 RX ADMIN — IPRATROPIUM BROMIDE AND ALBUTEROL SULFATE SCH ML: .5; 3 SOLUTION RESPIRATORY (INHALATION) at 23:22

## 2023-08-26 RX ADMIN — NITROGLYCERIN SCH: 2.5 CAPSULE ORAL at 10:38

## 2023-08-26 RX ADMIN — DIPHENHYDRAMINE HYDROCHLORIDE AND HYDROCORTISONE AND NYSTATIN AND TETRACYCLINE HYDROCHLORIDE SCH: KIT ORAL at 00:36

## 2023-08-26 RX ADMIN — WATER SCH MG: 1 INJECTION INTRAMUSCULAR; INTRAVENOUS; SUBCUTANEOUS at 13:53

## 2023-08-26 RX ADMIN — IPRATROPIUM BROMIDE AND ALBUTEROL SULFATE SCH ML: .5; 3 SOLUTION RESPIRATORY (INHALATION) at 10:09

## 2023-08-26 RX ADMIN — IPRATROPIUM BROMIDE AND ALBUTEROL SULFATE SCH ML: .5; 3 SOLUTION RESPIRATORY (INHALATION) at 18:24

## 2023-08-26 RX ADMIN — FUROSEMIDE SCH MG: 10 INJECTION, SOLUTION INTRAMUSCULAR; INTRAVENOUS at 05:39

## 2023-08-26 RX ADMIN — LORAZEPAM PRN MG: 2 INJECTION, SOLUTION INTRAMUSCULAR; INTRAVENOUS at 03:12

## 2023-08-26 RX ADMIN — CARVEDILOL SCH MG: 12.5 TABLET, FILM COATED ORAL at 22:56

## 2023-08-26 RX ADMIN — Medication SCH: at 10:36

## 2023-08-26 RX ADMIN — POTASSIUM CHLORIDE SCH: 10 TABLET, EXTENDED RELEASE ORAL at 10:37

## 2023-08-26 RX ADMIN — CEFTRIAXONE SCH MLS/HR: 1 INJECTION, SOLUTION INTRAVENOUS at 10:29

## 2023-08-26 RX ADMIN — FUROSEMIDE SCH MG: 10 INJECTION, SOLUTION INTRAMUSCULAR; INTRAVENOUS at 08:38

## 2023-08-26 RX ADMIN — SIMVASTATIN SCH MG: 20 TABLET, FILM COATED ORAL at 22:57

## 2023-08-26 RX ADMIN — BUSPIRONE HYDROCHLORIDE SCH: 5 TABLET ORAL at 10:37

## 2023-08-26 RX ADMIN — POLYVINYL ALCOHOL SCH: 14 SOLUTION/ DROPS OPHTHALMIC at 10:37

## 2023-08-26 RX ADMIN — GUAIFENESIN SCH MG: 600 TABLET, EXTENDED RELEASE ORAL at 22:56

## 2023-08-26 RX ADMIN — LORAZEPAM PRN MG: 2 INJECTION, SOLUTION INTRAMUSCULAR; INTRAVENOUS at 10:10

## 2023-08-26 RX ADMIN — IPRATROPIUM BROMIDE AND ALBUTEROL SULFATE SCH ML: .5; 3 SOLUTION RESPIRATORY (INHALATION) at 06:29

## 2023-08-26 RX ADMIN — ASPIRIN SCH: 81 TABLET, COATED ORAL at 10:37

## 2023-08-26 RX ADMIN — POLYVINYL ALCOHOL SCH ML: 14 SOLUTION/ DROPS OPHTHALMIC at 22:57

## 2023-08-26 RX ADMIN — TIOTROPIUM BROMIDE SCH EA: 18 CAPSULE ORAL; RESPIRATORY (INHALATION) at 13:00

## 2023-08-26 RX ADMIN — MAGNESIUM OXIDE TAB 400 MG (241.3 MG ELEMENTAL MG) SCH: 400 (241.3 MG) TAB at 10:37

## 2023-08-26 RX ADMIN — LORAZEPAM PRN MG: 2 INJECTION, SOLUTION INTRAMUSCULAR; INTRAVENOUS at 16:44

## 2023-08-26 RX ADMIN — POLYVINYL ALCOHOL SCH: 14 SOLUTION/ DROPS OPHTHALMIC at 13:37

## 2023-08-26 RX ADMIN — DIPHENHYDRAMINE HYDROCHLORIDE AND HYDROCORTISONE AND NYSTATIN AND TETRACYCLINE HYDROCHLORIDE PRN ML: KIT ORAL at 16:47

## 2023-08-26 RX ADMIN — WATER SCH MG: 1 INJECTION INTRAMUSCULAR; INTRAVENOUS; SUBCUTANEOUS at 05:39

## 2023-08-26 RX ADMIN — WATER SCH MG: 1 INJECTION INTRAMUSCULAR; INTRAVENOUS; SUBCUTANEOUS at 22:56

## 2023-08-26 RX ADMIN — DIPHENHYDRAMINE HYDROCHLORIDE AND HYDROCORTISONE AND NYSTATIN AND TETRACYCLINE HYDROCHLORIDE SCH ML: KIT ORAL at 05:40

## 2023-08-26 RX ADMIN — IPRATROPIUM BROMIDE AND ALBUTEROL SULFATE SCH ML: .5; 3 SOLUTION RESPIRATORY (INHALATION) at 14:17

## 2023-08-26 RX ADMIN — POTASSIUM CHLORIDE SCH MEQ: 10 TABLET, EXTENDED RELEASE ORAL at 22:57

## 2023-08-26 RX ADMIN — POLYVINYL ALCOHOL SCH: 14 SOLUTION/ DROPS OPHTHALMIC at 17:47

## 2023-08-26 RX ADMIN — CARVEDILOL SCH: 12.5 TABLET, FILM COATED ORAL at 10:37

## 2023-08-26 RX ADMIN — FUROSEMIDE SCH MG: 10 INJECTION, SOLUTION INTRAMUSCULAR; INTRAVENOUS at 19:55

## 2023-08-26 NOTE — PCM.NOTE
Date and Time: 08/26/23 0722





Subjective Assessment: 


Ms. Brar is a 73 year old female with a pmhx of COPD (4L oxygen at baseline), 

HTN, and CHF who presented with SOB, wheezing, and a NP cough, admitted for COPD

exacerbation and pneumonia being treated with high dose steroids (Solu-Medrol 

40mg/IV), DuoNebs prn, Spiriva, zithromax, and rocephin. CT imaging of the chest

showing severe emphysema and degenerative changes of the thoracic spine. Patient

continues with persistent dyspnea. Patient endorses continued shortness of 

breath and wheezing. Additionally the patient was noted with blood in her [place] 

bag. U/A suspicious for infection. Order placed for F/C to be removed and 

replaced. Patient started on Rocephin, urine culture showing no growth to date. 

Elevated BNP, IV lasix daily. Seen by Dr. Tiwari started on aminophylline, 

solumedrol tapered to 40mg Q8H. Patient with persistent severe dyspnea, impaired

air movement since yesterday, difficulty speaking, and tripod positioning while 

breathing. Placed on BIPAP. Patient was able to tolerate BIPAP for a total of 8 

hours last night, but has been refusing. Lung sounds diminished on auscultation.

Goals of care discussed with patient multiple times and the importance of the 

BIPAP, as well as the possibility of intubation as patient is declining, refuses

transfer to another facility. Patient was agreeable to try the BIPAP, but 

according to respiratory documentation was only able to tolerate it for less 

than 30 seconds with multiple attempts. Currently during interview she is 

tolerating it. Family is coming to discuss with patient goal of care. 








- Review of Systems


Constitutional: Fatigue, Weakness


Eyes: No Symptoms


Ears, Nose, & Throat: No Symptoms


Respiratory: Cough, Orthopnea, Short Of Breath, Wheezing


Cardiac: Orthopnea


Abdominal/Gastrointestinal: No Symptoms


Genitourinary Symptoms: No Symptoms


Musculoskeletal: Back Pain


Skin: No Symptoms


Neurological: No Symptoms


Psychological: No Symptoms


Endocrine: No Symptoms


Hematologic/Lymphatic: No Symptoms


Immunological/Allergic: No Symptoms





Objective Exam


General Appearance: mild distress


Neurologic Exam: alert, oriented x 3, cooperative


Skin Exam: pale


Eye Exam: PERRL


Ears, Nose, Throat Exam: normal ENT inspection


Neck Exam: normal inspection


Respiratory Exam: respiratory distress, diminished breath sounds, wheezing


Cardiovascular Exam: normal heart sounds


Gastrointestinal/Abdomen Exam: soft, normal bowel sounds


Extremity Exam: normal inspection


Back Exam: normal inspection





OBJECTIVE DATA


Vital Signs: 


                               Vital Signs - 24 hr











  Temp Pulse Resp BP Pulse Ox


 


 08/26/23 06:31   110 H  18   97


 


 08/26/23 04:00  97.1 F  114 H  21  153/94  91 L


 


 08/26/23 03:12   122 H  24  


 


 08/26/23 03:00   113 H  24   97


 


 08/26/23 00:00  97.3 F  100 H  16  154/92 


 


 08/25/23 22:34   122 H  28 H   95


 


 08/25/23 21:58   120 H  20  


 


 08/25/23 20:00  97 F  104 H  18  131/97  100


 


 08/25/23 18:21   116 H  28 H   95


 


 08/25/23 15:41  98.4 F  113 H  40 H  137/85  94 L


 


 08/25/23 15:16   110 H  28 H   96


 


 08/25/23 11:37  97.8 F  101 H  20  127/84  97


 


 08/25/23 11:27   101 H  20   97








                        Pain Assessment - Last Documented











Pain Intensity                 0


 


Pain Scale Used                0-10 Pain Scale











Intake and Output: 


                                 Intake & Output











 08/23/23 08/24/23 08/25/23 08/26/23





 11:59 11:59 11:59 11:59


 


Intake Total 1520 1236 1133 418


 


Output Total 2200 1900 2450 2700


 


Balance -030 -197 -7310 -3326


 


Weight 54.9 kg   











Lab Results: 


                            Lab Results-Last 24 Hours











  08/25/23 08/25/23 08/25/23 Range/Units





  04:35 07:15 11:20 


 


WBC     (4.0-10.5)  x10^3/uL


 


RBC     (4.1-5.4)  x10^6/uL


 


Hgb     (12.0-16.0)  g/dL


 


Hct     (35-47)  %


 


MCV     ()  fL


 


MCH     (26-32)  pg


 


MCHC     (32-36)  g/dL


 


RDW     (11.5-14.0)  %


 


Plt Count     (150-450)  x10^3/uL


 


MPV     (7.5-11.0)  fL


 


Segmented Neutrophils     (36.0-66.0)  %


 


Band Neutrophils     (0.0-2.0)  %


 


Lymphocytes (Manual)     (24-44)  %


 


Monocytes (Manual)     (0.0-12.0)  %


 


Toxic Granulation     


 


Platelet Estimate     (NORMAL)  


 


Puncture Site    LEFT BRACHIAL  


 


pCO2    64 H*  (35-45)  mmHg


 


pO2    104 H  ()  mmHg


 


Base Excess    21.1 H  (-2.0-2.0)  


 


O2 Saturation    97.3  ()  g/dF


 


ABG pH    7.48 H  (7.35-7.45)  


 


ABG HCO3    47.7 H*  (22-28)  


 


ABG O2 Sat (Measured)    99.6  ()  %


 


Mal Test    NOT APPLICABLE  


 


A-a Gradient    44  


 


a/A Ratio    0.70  


 


Hemoglobin    10.4  


 


Carboxyhemoglobin    1.3  (0.0-6.9)  % THgb


 


Methemoglobin    1.0 L  (1.4-1.5)  %


 


Potassium    3.8  (3.5-5.1)  


 


Temperature    37.0  C


 


POC O2 Flow Rate    32  %


 


Sodium     (137-145)  mmol/L


 


Chloride     ()  mmol/L


 


Carbon Dioxide     (22-30)  mmol/L


 


Anion Gap  3.3 L    (5-15)  MEQ/L


 


BUN     (7-17)  mg/dL


 


Creatinine     (0.52-1.04)  mg/dL


 


Estimated GFR     ML/MIN


 


Glucose     ()  mg/dL


 


Calcium     (8.4-10.2)  mg/dL


 


Total Bilirubin     (0.2-1.3)  mg/dL


 


AST     (14-36)  U/L


 


ALT     (0-35)  U/L


 


Alkaline Phosphatase     ()  U/L


 


Serum Total Protein     (6.3-8.2)  g/dL


 


Albumin     (3.5-5.0)  g/dL


 


Theophylline   10.6   (10-20)  ug/mL














  08/26/23 08/26/23 08/26/23 Range/Units





  05:29 05:29 05:29 


 


WBC  25.5 H*    (4.0-10.5)  x10^3/uL


 


RBC  3.61 L    (4.1-5.4)  x10^6/uL


 


Hgb  10.5 L    (12.0-16.0)  g/dL


 


Hct  33.3 L    (35-47)  %


 


MCV  92.2    ()  fL


 


MCH  29.1    (26-32)  pg


 


MCHC  31.5 L    (32-36)  g/dL


 


RDW  14.4 H    (11.5-14.0)  %


 


Plt Count  306    (150-450)  x10^3/uL


 


MPV  10.3    (7.5-11.0)  fL


 


Segmented Neutrophils  92 H    (36.0-66.0)  %


 


Band Neutrophils  2    (0.0-2.0)  %


 


Lymphocytes (Manual)  2 L    (24-44)  %


 


Monocytes (Manual)  4    (0.0-12.0)  %


 


Toxic Granulation  1+    


 


Platelet Estimate  NORMAL    (NORMAL)  


 


Puncture Site     


 


pCO2     (35-45)  mmHg


 


pO2     ()  mmHg


 


Base Excess     (-2.0-2.0)  


 


O2 Saturation     ()  g/dF


 


ABG pH     (7.35-7.45)  


 


ABG HCO3     (22-28)  


 


ABG O2 Sat (Measured)     ()  %


 


Mal Test     


 


A-a Gradient     


 


a/A Ratio     


 


Hemoglobin     


 


Carboxyhemoglobin     (0.0-6.9)  % THgb


 


Methemoglobin     (1.4-1.5)  %


 


Potassium   3.4 L   (3.5-5.1)  


 


Temperature     C


 


POC O2 Flow Rate     %


 


Sodium   139   (137-145)  mmol/L


 


Chloride   89 L   ()  mmol/L


 


Carbon Dioxide   44 H   (22-30)  mmol/L


 


Anion Gap   9.4   (5-15)  MEQ/L


 


BUN   36 H   (7-17)  mg/dL


 


Creatinine   0.77   (0.52-1.04)  mg/dL


 


Estimated GFR   > 60.0   ML/MIN


 


Glucose   167 H   ()  mg/dL


 


Calcium   9.0   (8.4-10.2)  mg/dL


 


Total Bilirubin   0.80   (0.2-1.3)  mg/dL


 


AST   31   (14-36)  U/L


 


ALT   26   (0-35)  U/L


 


Alkaline Phosphatase   67   ()  U/L


 


Serum Total Protein   6.0 L   (6.3-8.2)  g/dL


 


Albumin   3.6   (3.5-5.0)  g/dL


 


Theophylline    13.5  (10-20)  ug/mL











Multi-Disciplinary Progress Notes: 


                        Multi-Disciplinary Progress Notes





08/25/23 18:43 Respiratory Note by Mariza Nguyễn


Attempted to place patient on bipap at this time. 3 attempts were made but pt 

was unable to tolerate after less than 30 seconds each attempt. Pt refuses bipap

at this time. Risks of not going on bipap were explained to patient, patient 

continues to refuse bipap at this time.





Initialized on 08/25/23 18:43 - END OF NOTE








08/25/23 11:30 Case Management Note by Roxy Campbell


PATIENT BREATHING WORSE TODAY- PATIENT ADVISED TO GO ON BIPAP. 


WE DISCUSSED CODE STATUS AGAIN WITH PATIENT- SHE WOULD LIKE TO TALK WITH A 

 AND REQUESTED I CONTACT YELENA DENISE FOR HER. S/W YELENA- HE REPORTED 

SHE WOULD BE IN TO SEE PATIENT AS SOON AS HE COULD. PATIENT DECLINED FOR ME TO 

CALL ANYONE ELSE FOR HER. 


NO FURTHER DC PLANNING DONE AT THIS NICOL AS PATIENT IS NOT READY FOR DC AT THIS 

TIME. CAN REASSESS NEEDS CLOSER TO TIME





Initialized on 08/25/23 11:30 - END OF NOTE








08/25/23 09:14 Respiratory Note by Sheri Sheth DR. WAS CALLED EARLIER THIS AM AND UPDATED ON PT'S STATUS. HE 

DOESN'T WANT TO INCREASE THE STEROIDS AT THIS TIME DUE TO THE INCREASED BICARB. 

HE WANTS THE PT TO WEAR THE BIPAP AS MUCH AS POSSIBLE. PT AND NP YULI AWARE OF 

THIS AND PT STATES SHE IS WILLING TO TRY THE BIPAP AGAIN. PT WAS EDUCATED ON 

WHAT COULD HAPPEN IF SHE DOESN'T WEAR THE BIPAP. PT UNDERSTANDS THIS AND IS 

GOING TO TALK TO HER PREACHER SOMETIME TODAY REGARDING HER CODE STATUS. SHE 

WANTS TO WAIT UNTIL SHE TALKS TO HIM BEFORE MAKING THE DECISION.  





Initialized on 08/25/23 09:14 - END OF NOTE

















Assessment/Plan


(1) Acute exacerbation of chronic obstructive airways disease


Current Visit: Yes   Status: Acute   


Assessment & Plan: 


74 y/o F with severe emphysema, on 4L at home, p/w COPD exacerbation with some 

contribution of pneumonia. CT Chest shows severe emphysematous changes, and 

patient with persistent severe dyspnea, impaired air movement, difficulty 

speaking, and tripod positioning while breathing. She declined use of BiPAP, but

for now is agreeing to be placed back on.





- Continue methylprednisone 40 mg Q8 per Dr. Tiwari


- Duonebs 


- Spirivia


- Continue Mucinex 


-Dr. Carroll has started aminophylline


-Patient intermittently refusing bipap


-Consider CT 


Code(s): J44.1 - CHRONIC OBSTRUCTIVE PULMONARY DISEASE W (ACUTE) EXACERBATION   





(2) Pneumonia


Current Visit: Yes   Status: Acute   


Assessment & Plan: 


Rocephin, Zithromax


- Chest XR: 8/19


Nonacute chest with chronic features


- CT chest: 8/19 reviewed with findings of:


1. Emphysematous lungs with mild centriacinar emphysema in both lower lobes.


There is hyperinflation of the left lung with trans mediastinal herniation.


2. Small focal consolidation is seen in the left lower lobe SE 3 image #31.


3. No soliatry pulmonary nodule or definite lymphadenopathy to suggest


metastases.


4. Marked degenerative changes of the thoracic spine with kyphotic deformity


and compression fracture


-Repeat CXR tomorrow


8/22:


-CXR less inflated and grossly unchanged again demonstrating COPD,


mild bibasilar subsegmental atelectasis/scarring, and right lung suture


material/surgical clips.  Heart not enlarged again with tortuous descending


aorta.  No new/acute abnormalities.


-D/c zithromax, continue rocephin due to questionable UTI


Code(s): J18.9 - PNEUMONIA, UNSPECIFIED ORGANISM   





(3) UTI (urinary tract infection)


Current Visit: Yes   Status: Acute   


Assessment & Plan: 


-UA suspicious for UTI, will treat empirically with Rocephin pending culture


-D/C F/C, may replace with new F/C need new urine once cathed


8/24:


-u/a collected 8/23 suspicious for infection, pending culture, continue on 

rocephin


8/25/23:


-U cult negative


Code(s): N39.0 - URINARY TRACT INFECTION, SITE NOT SPECIFIED   





(4) Back pain, acute


Current Visit: No   Status: Acute   


Qualifiers: 


   Back pain location: low back pain 


Assessment & Plan: 


- Degenerative changes as seen per CT


- d/c naprosyn, add norco 


Code(s): M54.9 - DORSALGIA, UNSPECIFIED   





(5) CHF (congestive heart failure)


Current Visit: No   Status: Acute   


Qualifiers: 


   Heart failure type: systolic   Heart failure chronicity: acute on chronic   

Qualified Code(s): I50.23 - Acute on chronic systolic (congestive) heart failure

  


Assessment & Plan: 


- Chronic, no recent echo


-Strict I&O


-Monitor renal/lytes with goal K>4, MG >2


-BNP at 1080


- Continue Lasix 80mg BID, add one time dose of IV lasix 40mg


-Repeat BNP tomorrow


8/22:


-BNP at 1920, may be secondary to COPD exacerbation, does not appear to be in 

exacerbation, no noted edema on exam


8/24:


-Lasix changed from daily dose to IV lasix 40mg bid


Code(s): I50.9 - HEART FAILURE, UNSPECIFIED

## 2023-08-27 VITALS — RESPIRATION RATE: 28 BRPM | OXYGEN SATURATION: 98 % | HEART RATE: 111 BPM

## 2023-08-27 VITALS — SYSTOLIC BLOOD PRESSURE: 136 MMHG | TEMPERATURE: 98.7 F | DIASTOLIC BLOOD PRESSURE: 98 MMHG

## 2023-08-27 LAB
ALBUMIN SERPL-MCNC: 3.9 G/DL (ref 3.5–5)
ALP SERPL-CCNC: 70 U/L (ref 38–126)
ALT SERPL-CCNC: 25 U/L (ref 0–35)
ANION GAP SERPL CALC-SCNC: 13 MEQ/L (ref 5–15)
AST SERPL QL: 31 U/L (ref 14–36)
BILIRUB BLD-MCNC: 0.9 MG/DL (ref 0.2–1.3)
BUN SERPL-MCNC: 47 MG/DL (ref 7–17)
CALCIUM SPEC-MCNC: 8.8 MG/DL (ref 8.4–10.2)
CELLS COUNTED: 100
CHLORIDE SERPL-SCNC: 89 MMOL/L (ref 98–107)
CO2 SERPL-SCNC: 43 MMOL/L (ref 22–30)
CREAT SERPL-MCNC: 0.85 MG/DL (ref 0.52–1.04)
GFR SERPLBLD BASED ON 1.73 SQ M-ARVRAT: > 60 ML/MIN
GLUCOSE SERPL-MCNC: 206 MG/DL (ref 74–106)
HCT VFR BLD AUTO: 34.4 % (ref 35–47)
HGB BLD-MCNC: 10.8 G/DL (ref 12–16)
MCH RBC QN AUTO: 29.2 PG (ref 26–32)
MCHC RBC AUTO-ENTMCNC: 31.4 G/DL (ref 32–36)
NEUTS BAND # BLD MANUAL: 8 % (ref 0–2)
PLATELET # BLD AUTO: 337 X10^3/UL (ref 150–450)
POTASSIUM SERPLBLD-SCNC: 4 MMOL/L (ref 3.5–5.1)
PROT SERPL-MCNC: 6.4 G/DL (ref 6.3–8.2)
RBC # BLD AUTO: 3.7 X10^6/UL (ref 4.1–5.4)
SODIUM SERPL-SCNC: 141 MMOL/L (ref 137–145)
WBC # BLD AUTO: 28.6 X10^3/UL (ref 4–10.5)

## 2023-08-27 RX ADMIN — ASPIRIN SCH MG: 81 TABLET, COATED ORAL at 10:47

## 2023-08-27 RX ADMIN — CARVEDILOL SCH MG: 12.5 TABLET, FILM COATED ORAL at 10:47

## 2023-08-27 RX ADMIN — IPRATROPIUM BROMIDE AND ALBUTEROL SULFATE SCH ML: .5; 3 SOLUTION RESPIRATORY (INHALATION) at 07:20

## 2023-08-27 RX ADMIN — BUSPIRONE HYDROCHLORIDE SCH: 5 TABLET ORAL at 09:24

## 2023-08-27 RX ADMIN — WATER SCH MG: 1 INJECTION INTRAMUSCULAR; INTRAVENOUS; SUBCUTANEOUS at 05:22

## 2023-08-27 RX ADMIN — DIPHENHYDRAMINE HYDROCHLORIDE AND HYDROCORTISONE AND NYSTATIN AND TETRACYCLINE HYDROCHLORIDE PRN ML: KIT ORAL at 08:12

## 2023-08-27 RX ADMIN — MAGNESIUM OXIDE TAB 400 MG (241.3 MG ELEMENTAL MG) SCH: 400 (241.3 MG) TAB at 09:24

## 2023-08-27 RX ADMIN — MORPHINE SULFATE PRN MG: 2 INJECTION, SOLUTION INTRAMUSCULAR; INTRAVENOUS at 08:36

## 2023-08-27 RX ADMIN — LORAZEPAM PRN MG: 2 INJECTION, SOLUTION INTRAMUSCULAR; INTRAVENOUS at 09:13

## 2023-08-27 RX ADMIN — LORAZEPAM PRN MG: 2 INJECTION, SOLUTION INTRAMUSCULAR; INTRAVENOUS at 04:04

## 2023-08-27 RX ADMIN — POLYVINYL ALCOHOL SCH: 14 SOLUTION/ DROPS OPHTHALMIC at 09:24

## 2023-08-27 RX ADMIN — FUROSEMIDE SCH MG: 10 INJECTION, SOLUTION INTRAMUSCULAR; INTRAVENOUS at 08:36

## 2023-08-27 RX ADMIN — IPRATROPIUM BROMIDE AND ALBUTEROL SULFATE SCH: .5; 3 SOLUTION RESPIRATORY (INHALATION) at 16:11

## 2023-08-27 RX ADMIN — IPRATROPIUM BROMIDE AND ALBUTEROL SULFATE SCH ML: .5; 3 SOLUTION RESPIRATORY (INHALATION) at 02:58

## 2023-08-27 RX ADMIN — POTASSIUM CHLORIDE SCH: 10 TABLET, EXTENDED RELEASE ORAL at 09:24

## 2023-08-27 RX ADMIN — IPRATROPIUM BROMIDE AND ALBUTEROL SULFATE SCH ML: .5; 3 SOLUTION RESPIRATORY (INHALATION) at 11:50

## 2023-08-27 RX ADMIN — Medication SCH: at 09:24

## 2023-08-27 RX ADMIN — GUAIFENESIN SCH: 600 TABLET, EXTENDED RELEASE ORAL at 09:25

## 2023-08-27 RX ADMIN — MORPHINE SULFATE PRN MG: 2 INJECTION, SOLUTION INTRAMUSCULAR; INTRAVENOUS at 12:10

## 2023-08-27 RX ADMIN — TIOTROPIUM BROMIDE SCH: 18 CAPSULE ORAL; RESPIRATORY (INHALATION) at 11:47

## 2023-08-27 NOTE — PCM.DS
Discharge Summary


Date of Admission: 


08/20/23 12:21





Date of Discharge: 





8/27/23


Admitting Physician: 


AVNI CHRISTIAN MD





Consults: 





                                Consults on Case





08/22/23 09:20


Consult Pulmonology ROUTINE 











Primary Care Provider: 


KRIS CEDENO








Allergies


Allergies





Tetanus Vaccines and Toxoid [Tetanus] Allergy (Severe, Verified 08/19/23 16:08)


   throat swelling











Hospital Summary





- Hospital Course


Hospital Course: 





Ms. Brar is a 73 year old female with a pmhx of COPD (4L oxygen at baseline), 

HTN, and CHF who presented with SOB, wheezing, and a NP cough, admitted for COPD

 exacerbation and pneumonia being treated with high dose steroids (Solu-Medrol 

40mg/IV), DuoNebs prn, Spiriva, zithromax, and rocephin. CT imaging of the chest

 showing severe emphysema and degenerative changes of the thoracic spine. 

Patient continues with persistent dyspnea. Patient endorses continued shortness 

of breath and wheezing. Additionally the patient was noted with blood in her 

[place] bag. U/A suspicious for infection. Order placed for F/C to be removed and 

replaced. Patient started on Rocephin, urine culture showing no growth to date. 

Elevated BNP, IV lasix daily. Seen by Dr. Tiwari started on aminophylline, 

solumedrol tapered to 40mg Q8H. Patient with persistent severe dyspnea, impaired

 air movement, difficulty speaking, and tripod positioning while breathing. 

Placed on BIPAP with minimal improvement. Goals of care discussed with patient 

multiple times and the importance of the BIPAP, as well as the possibility of i

ntubation as patient is declining, initially refusing transfer to another 

facility but now agreeable. Wishes to remain a full code of multiple discussions

 regarding goals of care. 





Assessment/Plan


(1) Acute exacerbation of chronic obstructive airways disease


Current Visit: Yes   Status: Acute   


Assessment & Plan: 


74 y/o F with severe emphysema, on 4L at home, p/w COPD exacerbation with some c

ontribution of pneumonia. CT Chest shows severe emphysematous changes, and 

patient with persistent severe dyspnea, impaired air movement, difficulty 

speaking, and tripod positioning while breathing. She declined use of BiPAP, but

 for now is agreeing to be placed back on.





- Continue methylprednisone 40 mg Q8 per Dr. Tiwari


- Duonebs 


- Spirivia


- Continue Mucinex 


-Dr. Carroll has started aminophylline


-Patient intermittently refusing bipap


-Consider CT 


Code(s): J44.1 - CHRONIC OBSTRUCTIVE PULMONARY DISEASE W (ACUTE) EXACERBATION   





(2) Pneumonia


Current Visit: Yes   Status: Acute   


Assessment & Plan: 


Rocephin, Zithromax


- Chest XR: 8/19


Nonacute chest with chronic features


- CT chest: 8/19 reviewed with findings of:


1. Emphysematous lungs with mild centriacinar emphysema in both lower lobes.


There is hyperinflation of the left lung with trans mediastinal herniation.


2. Small focal consolidation is seen in the left lower lobe SE 3 image #31.


3. No soliatry pulmonary nodule or definite lymphadenopathy to suggest


metastases.


4. Marked degenerative changes of the thoracic spine with kyphotic deformity


and compression fracture


-Repeat CXR tomorrow


8/22:


-CXR less inflated and grossly unchanged again demonstrating COPD,


mild bibasilar subsegmental atelectasis/scarring, and right lung suture


material/surgical clips.  Heart not enlarged again with tortuous descending


aorta.  No new/acute abnormalities.


-D/c zithromax, continue rocephin due to questionable UTI


Code(s): J18.9 - PNEUMONIA, UNSPECIFIED ORGANISM   





(3) UTI (urinary tract infection)


Current Visit: Yes   Status: Acute   


Assessment & Plan: 


-UA suspicious for UTI, will treat empirically with Rocephin pending culture


-D/C F/C, may replace with new F/C need new urine once cathed


8/24:


-u/a collected 8/23 suspicious for infection, pending culture, continue on 

rocephin


8/25/23:


-U cult negative


Code(s): N39.0 - URINARY TRACT INFECTION, SITE NOT SPECIFIED   





(4) Back pain, acute


Current Visit: No   Status: Acute   


Qualifiers: 


   Back pain location: low back pain 


Assessment & Plan: 


- Degenerative changes as seen per CT


- d/c naprosyn, add norco 


Code(s): M54.9 - DORSALGIA, UNSPECIFIED   





(5) CHF (congestive heart failure)


Current Visit: No   Status: Acute   


Qualifiers: 


   Heart failure type: systolic   Heart failure chronicity: acute on chronic   

Qualified Code(s): I50.23 - Acute on chronic systolic (congestive) heart failure

   


Assessment & Plan: 


- Chronic, no recent echo


-Strict I&O


-Monitor renal/lytes with goal K>4, MG >2


-BNP at 1080


- Continue Lasix 80mg BID, add one time dose of IV lasix 40mg


-Repeat BNP tomorrow


8/22:


-BNP at 1920, may be secondary to COPD exacerbation, does not appear to be in 

exacerbation, no noted edema on exam


8/24:


-Lasix changed from daily dose to IV lasix 40mg bid





- Vitals & Intake/Output


Vital Signs: 





                                   Vital Signs











Temperature  97.8 F   08/27/23 07:06


 


Pulse Rate  111 H  08/27/23 07:06


 


Respiratory Rate  23   08/27/23 07:06


 


Blood Pressure  144/94   08/27/23 07:06


 


O2 Sat by Pulse Oximetry  92 L  08/27/23 07:06











Intake & Output: 





                                 Intake & Output











 08/24/23 08/25/23 08/26/23 08/27/23





 11:59 11:59 11:59 11:59


 


Intake Total 1236 1133 418 575


 


Output Total 1900 2450 2700 2400


 


Balance -664 -1317 -2282 -1825














- Lab


Result Diagrams: 


                                 08/27/23 05:20





                                 08/27/23 05:20


Lab Results-Last 24 Hrs: 





                            Lab Results-Last 24 Hours











  08/27/23 08/27/23 Range/Units





  05:20 05:20 


 


WBC  28.6 H*   (4.0-10.5)  x10^3/uL


 


RBC  3.70 L   (4.1-5.4)  x10^6/uL


 


Hgb  10.8 L   (12.0-16.0)  g/dL


 


Hct  34.4 L   (35-47)  %


 


MCV  93.0   ()  fL


 


MCH  29.2   (26-32)  pg


 


MCHC  31.4 L   (32-36)  g/dL


 


RDW  14.5 H   (11.5-14.0)  %


 


Plt Count  337   (150-450)  x10^3/uL


 


MPV  10.5   (7.5-11.0)  fL


 


Segmented Neutrophils  85 H   (36.0-66.0)  %


 


Band Neutrophils  8 H   (0.0-2.0)  %


 


Lymphocytes (Manual)  4 L   (24-44)  %


 


Monocytes (Manual)  3   (0.0-12.0)  %


 


Platelet Estimate  NORMAL   (NORMAL)  


 


Sodium   141  (137-145)  mmol/L


 


Potassium   4.0  (3.5-5.1)  mmol/L


 


Chloride   89 L  ()  mmol/L


 


Carbon Dioxide   43 H  (22-30)  mmol/L


 


Anion Gap   13  (5-15)  MEQ/L


 


BUN   47 H  (7-17)  mg/dL


 


Creatinine   0.85  (0.52-1.04)  mg/dL


 


Estimated GFR   > 60.0  ML/MIN


 


Glucose   206 H  ()  mg/dL


 


Calcium   8.8  (8.4-10.2)  mg/dL


 


Total Bilirubin   0.90  (0.2-1.3)  mg/dL


 


AST   31  (14-36)  U/L


 


ALT   25  (0-35)  U/L


 


Alkaline Phosphatase   70  ()  U/L


 


Serum Total Protein   6.4  (6.3-8.2)  g/dL


 


Albumin   3.9  (3.5-5.0)  g/dL











Micro Results-Entire Visit: 





                                  Microbiology











 08/23/23 11:48 Urine Culture - Final





 Urine, Void    NO GROWTH


 


 08/22/23 16:40 Urine Culture - Final





 Catherized    NO GROWTH


 


 08/19/23 16:20 Blood Culture - Final





 Blood 


 


 08/19/23 16:15 Blood Culture - Final





 Blood 


 


 08/19/23 21:33 Urine Culture - Final





 Catherized    <10K NORMAL SKIN HORACE





    PROBABLE SKIN CONTAMINANT














- Procedures and Test


Procedures and Tests throughout Hospitalization: 





                            Therapy Orders & Screens





08/19/23 16:38


Respiratory Therapy Assessment DAILY 


   Comment: 





08/19/23 19:16


Oxygen Nasal Cannula 4 lpm 


   Comment: 


Respiratory Therapy Consult ONCE 


   Comment: 


   Reason For Exam: 





08/19/23 22:42


RT Screen per Nursing Assess ONCE 


   Comment: Protocol Order


   Physician Instructions: Greater than 3 points order RT Admission Screen


   Reason For Exam: Triggered on Admission


   Diagnosis: copd exacerbation


   Diagnosis: copd exacerbation


   Pneumonia: Yes


   Home O2: Yes: 4L


   Asthma: No


   CHF: No


   Home CPAP/BIPAP: No


   Home Nebs/MDI: Yes


   Total Points: 13





08/20/23 13:52


Flutter Therapy UD 


   Comment: 


   Diagnosis: copd exacerbation





08/24/23 23:22


BiPap/CPAP ROUTINE 


   Comment: 


   Diagnosis: EXAC COPD, PNEUMONIA














Discharge Exam


General Appearance: severe distress


Neurologic Exam: alert, oriented x 3


Eye Exam: PERRL


Respiratory Exam: diminished breath sounds, accessory muscle use, wheezing


Cardiovascular Exam: regular rate/rhythm, normal heart sounds


Gastrointestinal/Abdomen Exam: soft, tenderness


Pelvic Exam: deferred


Rectal Exam: deferred


Extremity Exam: normal inspection


Skin Exam: pale





Final Diagnosis/Problem List





- Final Discharge Diagnosis/Problem


(1) Acute exacerbation of chronic obstructive airways disease


Current Visit: Yes   Status: Acute   Code(s): J44.1 - CHRONIC OBSTRUCTIVE 

PULMONARY DISEASE W (ACUTE) EXACERBATION   





(2) Pneumonia


Current Visit: Yes   Status: Acute   Code(s): J18.9 - PNEUMONIA, UNSPECIFIED 

ORGANISM   





(3) UTI (urinary tract infection)


Current Visit: Yes   Status: Resolved   Code(s): N39.0 - URINARY TRACT 

INFECTION, SITE NOT SPECIFIED   





(4) Back pain, acute


Current Visit: No   Status: Chronic   Code(s): M54.9 - DORSALGIA, UNSPECIFIED   





(5) CHF (congestive heart failure)


Current Visit: No   Status: Chronic   Code(s): I50.9 - HEART FAILURE, 

UNSPECIFIED   





- Discharge


Disposition: DC TO OTHER HOSP


Condition: Poor


Prescriptions: 


New


   Lactobacillus Acidophilus*** [Acidophilus TABLET***] 1 tab PO DAILY  tablet


   Polyvinyl Alcohol Tears*** [Artificial Tears 15 ML***] 1 ml OP QID


   Albuterol/Ipratropium 3ml Neb* [DUONEB 0.5-3 MG/3 ml Neb**] 3 ml IH Q2HPRN 

PRN


     PRN Reason: Shortness Of Breath/Wheezing


   Enoxaparin Sodium*** [Enoxaparin Sodium] 40 mg SQ DAILY


   Furosemide 40 mg/4 ml*** [Lasix 40 MG/4 ML***] 40 mg IV Q12H


   Guaifenesin 600 mg ER*** [Mucinex 600MG ER Tabs***] 600 mg PO BID  tablet


   Methylprednisolone Sod Suc 40M [solu-MEDROL] 40 mg IV Q8HT


   Tiotropium Bromide Inhaler*** [Spiriva 18 Mcg/Cap Inhaler***] 1 ea IH DAILY  

inhaler


   ALPRAZolam 1 MG*** [Xanax 1 mg***] 1 mg PO HS PRN PRN  tablet


     PRN Reason: Insomnia





Continue


   Calcium Citrate/Vitamin D3 [Citracal + D Caplet] 2 each PO BID


   Atorvastatin Calcium [Lipitor] 40 mg PO HS


   Albuterol/Ipratropium 3ml Neb* [DUONEB 0.5-3 MG/3 ml Neb**] 3 ml IH Q4H


   Albuterol Sulfate Mdi*** [ALBUTEROL/Proair Hfa MDI***] 8.5 gm IH Q4HWA PRN


     PRN Reason: Shortness Of Breath


   Multivitamin [Multi-Vitamin Daily] 1 tab PO DAILY


   Tiotropium Bromide [Spiriva] 18 mcg IH DAILY


   Cholecalciferol (Vitamin D3) [Vitamin D3] 50 mg PO DAILY


   Potassium Chloride Tab* [Klor Con] 10 meq PO BID


   Aspirin [Aspirin EC] 81 mg PO DAILY


   Zoledronic Acid/Mannitol&Water [Reclast 5 mg/100 ml Solution] 5 mg IV UD


   Magnesium Oxide [Magnesium] 250 mg PO DAILY


   Furosemide 80 mg PO BID


   Biotin 10,000 mcg PO DAILY


   Ascorbic Acid [Vitamin C] 1,000 mg PO DAILY





Discontinued


   Prednisone 10 mg*** [Deltasone 10 mg***] 10 mg PO DAILY


Additional Instructions: 


IF YOU BECOME INTERESTED I [facility]- DISCUSS THIS OPTION WITH DR JADEN TIWARI


Follow up with: 


KRIS CEDENO [Primary Care Provider] - 08/31/23 10:15 am


TOM TIWARI [ACTIVE STAFF] - 09/06/23 3:45 pm (at [facility])